# Patient Record
Sex: MALE | Race: WHITE | NOT HISPANIC OR LATINO | Employment: OTHER | URBAN - METROPOLITAN AREA
[De-identification: names, ages, dates, MRNs, and addresses within clinical notes are randomized per-mention and may not be internally consistent; named-entity substitution may affect disease eponyms.]

---

## 2023-09-14 PROBLEM — R51.9 HEADACHE: Status: RESOLVED | Noted: 2023-09-14 | Resolved: 2023-09-14

## 2023-09-14 PROBLEM — R03.0 ELEVATED BLOOD PRESSURE READING: Status: ACTIVE | Noted: 2023-09-14

## 2023-09-14 PROBLEM — E55.9 VITAMIN D DEFICIENCY: Status: ACTIVE | Noted: 2023-09-14

## 2023-09-14 PROBLEM — I73.9 CLAUDICATION (CMS-HCC): Status: ACTIVE | Noted: 2023-09-14

## 2023-09-14 PROBLEM — J01.90 ACUTE SINUSITIS: Status: RESOLVED | Noted: 2023-09-14 | Resolved: 2023-09-14

## 2023-09-14 PROBLEM — H69.90 EUSTACHIAN TUBE DYSFUNCTION: Status: ACTIVE | Noted: 2023-09-14

## 2023-09-14 PROBLEM — R10.13 DYSPEPSIA: Status: ACTIVE | Noted: 2023-09-14

## 2023-09-14 PROBLEM — K29.50 CHRONIC GASTRITIS WITHOUT BLEEDING: Status: ACTIVE | Noted: 2023-09-14

## 2023-09-14 PROBLEM — K20.90 ESOPHAGITIS: Status: ACTIVE | Noted: 2023-09-14

## 2023-09-14 PROBLEM — F10.10 ALCOHOL CONSUMPTION BINGE DRINKING: Status: ACTIVE | Noted: 2023-09-14

## 2023-09-14 PROBLEM — F41.9 ANXIETY: Status: ACTIVE | Noted: 2023-09-14

## 2023-09-14 PROBLEM — K22.10 BARRETT'S ESOPHAGEAL ULCERATION: Status: ACTIVE | Noted: 2023-09-14

## 2023-09-14 PROBLEM — I10 ESSENTIAL HYPERTENSION: Status: ACTIVE | Noted: 2023-09-14

## 2023-09-14 PROBLEM — G47.00 INSOMNIA: Status: ACTIVE | Noted: 2023-09-14

## 2023-09-14 PROBLEM — E78.5 DYSLIPIDEMIA: Status: ACTIVE | Noted: 2023-09-14

## 2023-09-14 PROBLEM — F32.A DEPRESSION: Status: ACTIVE | Noted: 2023-09-14

## 2023-09-14 PROBLEM — K22.719 BARRETT'S ESOPHAGUS WITH DYSPLASIA: Status: ACTIVE | Noted: 2023-09-14

## 2023-09-14 PROBLEM — I10 HYPERTENSIVE DISORDER: Status: RESOLVED | Noted: 2023-09-14 | Resolved: 2023-09-14

## 2023-09-14 RX ORDER — AMLODIPINE BESYLATE 5 MG/1
1 TABLET ORAL DAILY
COMMUNITY
Start: 2019-03-21 | End: 2023-11-14 | Stop reason: WASHOUT

## 2023-09-14 RX ORDER — LISINOPRIL 40 MG/1
1 TABLET ORAL DAILY
COMMUNITY
End: 2023-10-06

## 2023-09-14 RX ORDER — NAPROXEN SODIUM 220 MG
1 TABLET ORAL EVERY 12 HOURS PRN
COMMUNITY
End: 2023-11-14 | Stop reason: WASHOUT

## 2023-09-14 RX ORDER — VIT C/ZN GLUC/HERBAL NO.325 90 MG-15MG
1 LOZENGE MUCOUS MEMBRANE DAILY
COMMUNITY

## 2023-09-14 RX ORDER — LISINOPRIL 10 MG/1
TABLET ORAL
COMMUNITY
Start: 2019-03-21 | End: 2023-10-06 | Stop reason: ALTCHOICE

## 2023-09-14 RX ORDER — CALCIUM CARBONATE 600 MG
1 TABLET ORAL
COMMUNITY
End: 2023-11-14 | Stop reason: WASHOUT

## 2023-09-14 RX ORDER — PANTOPRAZOLE SODIUM 40 MG/1
1 TABLET, DELAYED RELEASE ORAL DAILY
COMMUNITY
Start: 2019-02-15

## 2023-09-14 RX ORDER — ACETAMINOPHEN 500 MG
1 TABLET ORAL DAILY
COMMUNITY
Start: 2018-12-13 | End: 2023-11-14 | Stop reason: WASHOUT

## 2023-09-14 RX ORDER — LISINOPRIL 20 MG/1
1 TABLET ORAL DAILY
COMMUNITY
End: 2023-10-06 | Stop reason: ALTCHOICE

## 2023-09-14 RX ORDER — HYDROXYZINE PAMOATE 25 MG/1
1 CAPSULE ORAL EVERY 6 HOURS PRN
COMMUNITY
End: 2023-11-14 | Stop reason: WASHOUT

## 2023-09-14 RX ORDER — GUAIFENESIN/PHENYLPROPANOLAMIN
EXPECTORANT ORAL
COMMUNITY
End: 2023-11-14 | Stop reason: WASHOUT

## 2023-09-14 RX ORDER — NAPROXEN SODIUM 220 MG/1
1 TABLET ORAL DAILY
COMMUNITY

## 2023-09-14 RX ORDER — VIT C/E/ZN/COPPR/LUTEIN/ZEAXAN 250MG-90MG
1 CAPSULE ORAL DAILY
COMMUNITY
End: 2023-11-14 | Stop reason: WASHOUT

## 2023-09-14 RX ORDER — TRAZODONE HYDROCHLORIDE 50 MG/1
1 TABLET ORAL NIGHTLY
COMMUNITY
Start: 2014-09-23 | End: 2023-11-14 | Stop reason: WASHOUT

## 2023-10-06 DIAGNOSIS — I10 ESSENTIAL HYPERTENSION: ICD-10-CM

## 2023-10-06 RX ORDER — LISINOPRIL 40 MG/1
40 TABLET ORAL DAILY
Qty: 90 TABLET | Refills: 1 | Status: SHIPPED | OUTPATIENT
Start: 2023-10-06 | End: 2024-04-09

## 2023-10-08 DIAGNOSIS — I10 ESSENTIAL HYPERTENSION: Primary | ICD-10-CM

## 2023-10-09 RX ORDER — AMLODIPINE BESYLATE 2.5 MG/1
2.5 TABLET ORAL DAILY
Qty: 30 TABLET | Refills: 2 | Status: SHIPPED | OUTPATIENT
Start: 2023-10-09 | End: 2023-12-21

## 2023-11-14 ENCOUNTER — OFFICE VISIT (OUTPATIENT)
Dept: GASTROENTEROLOGY | Facility: CLINIC | Age: 63
End: 2023-11-14
Payer: MEDICARE

## 2023-11-14 VITALS
SYSTOLIC BLOOD PRESSURE: 162 MMHG | DIASTOLIC BLOOD PRESSURE: 93 MMHG | HEIGHT: 70 IN | WEIGHT: 183 LBS | BODY MASS INDEX: 26.2 KG/M2 | HEART RATE: 74 BPM | TEMPERATURE: 97.4 F

## 2023-11-14 DIAGNOSIS — Z80.0 FAMILY HISTORY OF ESOPHAGEAL CANCER: Primary | ICD-10-CM

## 2023-11-14 DIAGNOSIS — K22.719 BARRETT'S ESOPHAGUS WITH DYSPLASIA: ICD-10-CM

## 2023-11-14 DIAGNOSIS — Z80.0 FAMILY HISTORY OF GASTRIC CANCER: ICD-10-CM

## 2023-11-14 DIAGNOSIS — K22.10 BARRETT'S ESOPHAGEAL ULCERATION: ICD-10-CM

## 2023-11-14 PROCEDURE — 99213 OFFICE O/P EST LOW 20 MIN: CPT | Performed by: INTERNAL MEDICINE

## 2023-11-14 PROCEDURE — 99203 OFFICE O/P NEW LOW 30 MIN: CPT | Performed by: INTERNAL MEDICINE

## 2023-11-14 PROCEDURE — 1159F MED LIST DOCD IN RCRD: CPT | Performed by: INTERNAL MEDICINE

## 2023-11-14 PROCEDURE — 3077F SYST BP >= 140 MM HG: CPT | Performed by: INTERNAL MEDICINE

## 2023-11-14 PROCEDURE — 1160F RVW MEDS BY RX/DR IN RCRD: CPT | Performed by: INTERNAL MEDICINE

## 2023-11-14 PROCEDURE — 1036F TOBACCO NON-USER: CPT | Performed by: INTERNAL MEDICINE

## 2023-11-14 PROCEDURE — 3080F DIAST BP >= 90 MM HG: CPT | Performed by: INTERNAL MEDICINE

## 2023-11-14 RX ORDER — ACETAMINOPHEN 500 MG
TABLET ORAL DAILY
COMMUNITY

## 2023-11-14 NOTE — PROGRESS NOTES
"Subjective   Patient ID: Jayjay Block is a 63 y.o. male who presents for New Patient Visit (/Discomfort in stomach).  Here as a new patient; daughter was a patient with bile duct stones   Retired from maintenance but still working   Jimenez's diagnosed 5 years ago with \"never ending heartburn\"  PPI used daily with only pantoprazole working for him; sometimes baking soda helps too  Other PPI not effective  Dr. Torres did last EGD back in 2019 Jimenez's from 34-36 cm    EGD repeated 2023 - [facility]   Doctor said to take pantoprazole twice a day  Emesis last week with bile   Episodes last 3 to 5 days  Rx Trazodone but did not try it due to labeling and using melatonin prn    Anxiety runs in the family   Sister with schizophrenia     GB removed 20 years ago by Dr Booth           Review of Systems    Objective   Physical Exam  Constitutional:       Appearance: Normal appearance.   Pulmonary:      Effort: Pulmonary effort is normal.   Neurological:      Mental Status: He is alert.   Psychiatric:      Comments: Seems anxious         Assessment/Plan   Assess for esophageal spasm/uncontrolled bile reflux on PPI  Assess for dilated bile duct as cause of intermittent pain reminiscent of biliary colic        "

## 2023-11-14 NOTE — PATIENT INSTRUCTIONS
You are a candidate for esophageal motility testing and pH/impedence testing to see if the ongoing symptoms are due to spasm or uncontrolled reflux.

## 2023-11-15 ENCOUNTER — HOSPITAL ENCOUNTER (OUTPATIENT)
Dept: RADIOLOGY | Facility: HOSPITAL | Age: 63
Discharge: HOME | End: 2023-11-15
Payer: MEDICARE

## 2023-11-15 DIAGNOSIS — Z80.0 FAMILY HISTORY OF GASTRIC CANCER: ICD-10-CM

## 2023-11-15 DIAGNOSIS — Z80.0 FAMILY HISTORY OF ESOPHAGEAL CANCER: ICD-10-CM

## 2023-11-15 DIAGNOSIS — K22.10 BARRETT'S ESOPHAGEAL ULCERATION: ICD-10-CM

## 2023-11-15 PROCEDURE — 76705 ECHO EXAM OF ABDOMEN: CPT

## 2023-11-27 ENCOUNTER — HOSPITAL ENCOUNTER (OUTPATIENT)
Dept: GASTROENTEROLOGY | Facility: HOSPITAL | Age: 63
Discharge: HOME | End: 2023-11-27
Payer: MEDICARE

## 2023-11-27 VITALS
SYSTOLIC BLOOD PRESSURE: 171 MMHG | RESPIRATION RATE: 16 BRPM | DIASTOLIC BLOOD PRESSURE: 84 MMHG | OXYGEN SATURATION: 100 % | HEART RATE: 58 BPM

## 2023-11-27 DIAGNOSIS — K22.10 BARRETT'S ESOPHAGEAL ULCERATION: ICD-10-CM

## 2023-11-27 DIAGNOSIS — Z80.0 FAMILY HISTORY OF GASTRIC CANCER: ICD-10-CM

## 2023-11-27 DIAGNOSIS — Z80.0 FAMILY HISTORY OF ESOPHAGEAL CANCER: ICD-10-CM

## 2023-11-27 PROCEDURE — 2500000001 HC RX 250 WO HCPCS SELF ADMINISTERED DRUGS (ALT 637 FOR MEDICARE OP): Performed by: INTERNAL MEDICINE

## 2023-11-27 PROCEDURE — 91010 ESOPHAGUS MOTILITY STUDY: CPT | Mod: MUE

## 2023-11-27 PROCEDURE — 91010 ESOPHAGUS MOTILITY STUDY: CPT

## 2023-11-27 PROCEDURE — 91010 ESOPHAGUS MOTILITY STUDY: CPT | Performed by: INTERNAL MEDICINE

## 2023-11-27 RX ORDER — LIDOCAINE HYDROCHLORIDE 20 MG/ML
1 JELLY TOPICAL ONCE
Status: COMPLETED | OUTPATIENT
Start: 2023-11-27 | End: 2023-11-27

## 2023-11-27 RX ADMIN — LIDOCAINE HYDROCHLORIDE 1 APPLICATION: 20 JELLY TOPICAL at 10:30

## 2023-11-27 ASSESSMENT — PAIN SCALES - GENERAL: PAINLEVEL_OUTOF10: 0 - NO PAIN

## 2023-12-13 PROCEDURE — 91037 ESOPH IMPED FUNCTION TEST: CPT | Performed by: INTERNAL MEDICINE

## 2023-12-13 NOTE — ADDENDUM NOTE
Encounter addended by: Billy Johnson MD on: 12/13/2023 4:13 PM   Actions taken: Charge Capture section accepted

## 2023-12-19 ENCOUNTER — OFFICE VISIT (OUTPATIENT)
Dept: GASTROENTEROLOGY | Facility: CLINIC | Age: 63
End: 2023-12-19
Payer: MEDICARE

## 2023-12-19 VITALS
WEIGHT: 190.9 LBS | HEART RATE: 60 BPM | SYSTOLIC BLOOD PRESSURE: 128 MMHG | TEMPERATURE: 98.2 F | HEIGHT: 70 IN | BODY MASS INDEX: 27.33 KG/M2 | DIASTOLIC BLOOD PRESSURE: 73 MMHG

## 2023-12-19 DIAGNOSIS — K22.70 BARRETT'S ESOPHAGUS WITHOUT DYSPLASIA: Primary | ICD-10-CM

## 2023-12-19 PROCEDURE — 1159F MED LIST DOCD IN RCRD: CPT | Performed by: INTERNAL MEDICINE

## 2023-12-19 PROCEDURE — 99212 OFFICE O/P EST SF 10 MIN: CPT | Performed by: INTERNAL MEDICINE

## 2023-12-19 PROCEDURE — 1125F AMNT PAIN NOTED PAIN PRSNT: CPT | Performed by: INTERNAL MEDICINE

## 2023-12-19 PROCEDURE — 3074F SYST BP LT 130 MM HG: CPT | Performed by: INTERNAL MEDICINE

## 2023-12-19 PROCEDURE — 1036F TOBACCO NON-USER: CPT | Performed by: INTERNAL MEDICINE

## 2023-12-19 PROCEDURE — 3078F DIAST BP <80 MM HG: CPT | Performed by: INTERNAL MEDICINE

## 2023-12-19 PROCEDURE — 1160F RVW MEDS BY RX/DR IN RCRD: CPT | Performed by: INTERNAL MEDICINE

## 2023-12-19 RX ORDER — GUAIFENESIN/PHENYLPROPANOLAMIN
EXPECTORANT ORAL
COMMUNITY

## 2023-12-19 RX ORDER — VIT C/ZN GLUC/HERBAL NO.325 90 MG-15MG
LOZENGE MUCOUS MEMBRANE EVERY 24 HOURS
COMMUNITY
End: 2023-12-19 | Stop reason: WASHOUT

## 2023-12-19 RX ORDER — GLUC/MSM/COLGN2/HYAL/ANTIARTH3 375-375-20
TABLET ORAL EVERY 24 HOURS
COMMUNITY

## 2023-12-19 RX ORDER — NAPROXEN SODIUM 220 MG/1
1 TABLET ORAL EVERY 24 HOURS
COMMUNITY
End: 2023-12-19 | Stop reason: WASHOUT

## 2023-12-19 RX ORDER — VIT C/E/ZN/COPPR/LUTEIN/ZEAXAN 250MG-90MG
1 CAPSULE ORAL EVERY 24 HOURS
COMMUNITY

## 2023-12-19 ASSESSMENT — PAIN SCALES - GENERAL: PAINLEVEL: 2

## 2023-12-19 NOTE — PATIENT INSTRUCTIONS
Glad you are feeling well  No spasms were noted on the esophageal motility study   Stay on the pantoprazole  You are due for endoscopy with surveillance every 3 because of your family history

## 2023-12-19 NOTE — PROGRESS NOTES
Subjective   Patient ID: Jayjay Block is a 63 y.o. male who presents for Follow-up.  Here for follow up after EMOT for BE without dysplasia    EMOT showed low LES and incomplete bolus passage    Feels better due to less stress - changes he made in his environment          Review of Systems    Objective   Physical Exam  Constitutional:       Appearance: Normal appearance.   Neurological:      Mental Status: He is alert.         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

## 2023-12-20 DIAGNOSIS — I10 ESSENTIAL HYPERTENSION: ICD-10-CM

## 2023-12-21 RX ORDER — AMLODIPINE BESYLATE 2.5 MG/1
2.5 TABLET ORAL DAILY
Qty: 90 TABLET | Refills: 0 | Status: SHIPPED | OUTPATIENT
Start: 2023-12-21 | End: 2024-04-09

## 2024-03-05 NOTE — PROGRESS NOTES
Subjective   Patient ID: Jayjay Block is a 63 y.o. male who presents for Annual Exam.    HPI        H/O hypertension- compliant with medications and low-salt diet. Denied any side effects from the medication. Denied any headaches, chest pain, pedal edema.              H/O of tobacco abuse. Quit smoking 15 years ago. Stated he used to smoke only half a pack a day, smoked for 20-25 years.         H/O Anxiety- stated since he retired, anxiety symptoms better. Denied any complaints today.         Elevated PSA- has not followed with urologist yet  Saw Dr Bonner for GERD, had  Ph testing, symptoms better now. H/O esophageal and stomach cancer in family    Complaining of itching on back for past few weeks, symptoms usually get better in summer    Refused pneumonia vaccine    Review of Systems   Constitutional:  Negative for fatigue and unexpected weight change.   HENT:  Negative for congestion, ear pain, sinus pain and sore throat.    Respiratory:  Negative for cough, shortness of breath and wheezing.    Cardiovascular:  Negative for chest pain, palpitations and leg swelling.   Gastrointestinal:  Negative for abdominal pain, blood in stool and constipation.   Endocrine: Negative for polydipsia, polyphagia and polyuria.   Genitourinary:  Negative for hematuria and urgency.   Musculoskeletal:  Negative for arthralgias and joint swelling.   Skin:  Negative for rash.        Itching on back and ankles   Neurological:  Negative for tremors, syncope, weakness and numbness.   Psychiatric/Behavioral:  Positive for behavioral problems. The patient is not nervous/anxious.        Objective   /75 (BP Location: Left arm, Patient Position: Sitting, BP Cuff Size: Adult)   Pulse 82   Temp 36.7 °C (98 °F) (Temporal)   Wt 86.2 kg (190 lb)   SpO2 98%   BMI 27.26 kg/m²     Physical Exam  Constitutional:       General: He is not in acute distress.  HENT:      Head: Normocephalic and atraumatic.      Right Ear: Tympanic membrane  normal.      Left Ear: Tympanic membrane normal.   Eyes:      Extraocular Movements: Extraocular movements intact.      Conjunctiva/sclera: Conjunctivae normal.      Pupils: Pupils are equal, round, and reactive to light.   Neck:      Vascular: No carotid bruit.   Cardiovascular:      Rate and Rhythm: Normal rate and regular rhythm.      Pulses: Normal pulses.      Heart sounds: No murmur heard.  Pulmonary:      Effort: Pulmonary effort is normal.      Breath sounds: Normal breath sounds. No wheezing or rales.   Abdominal:      General: Bowel sounds are normal.      Palpations: Abdomen is soft. There is no mass.      Tenderness: There is no abdominal tenderness. There is no guarding.   Musculoskeletal:         General: Normal range of motion.      Cervical back: Normal range of motion and neck supple.      Right lower leg: No edema.      Left lower leg: No edema.   Lymphadenopathy:      Cervical: No cervical adenopathy.   Skin:     General: Skin is warm and dry.      Capillary Refill: Capillary refill takes less than 2 seconds.      Comments: Tiny raised brown spot on his back with minimal scaly area   Neurological:      General: No focal deficit present.      Mental Status: He is alert and oriented to person, place, and time.      Sensory: No sensory deficit.      Gait: Gait normal.   Psychiatric:         Mood and Affect: Mood normal.         Assessment/Plan        Jayjay was seen today for annual exam.  Diagnoses and all orders for this visit:  Medicare annual wellness visit, initial (Primary)  Essential hypertension  -     CBC and Auto Differential; Future  -     Comprehensive Metabolic Panel; Future  Dyslipidemia  -     Lipid Panel; Future  Screening for prostate cancer  -     Prostate Specific Antigen, Screen; Future  Anxiety  -     TSH with reflex to Free T4 if abnormal; Future  Hyperglycemia  -     Hemoglobin A1C; Future       Advised to use CeraVe or Cetaphil over-the-counter  Recommended follow-up with  dermatology for yearly skin check    Current Outpatient Medications   Medication Instructions    amLODIPine (NORVASC) 2.5 mg, oral, Daily    calcium carbonate (CALCIUM 600 ORAL) oral    calcium carbonate-vitamin D3 (Calcium 600 + D,3,) 600 mg-5 mcg (200 unit) capsule Every 24 hours    cholecalciferol (Vitamin D-3) 25 MCG (1000 UT) capsule 1 capsule, Every 24 hours    cholecalciferol (Vitamin D3) 5,000 Units tablet oral, Daily    lisinopril 40 mg, oral, Daily    omega 3-dha-epa-fish oil (Fish OiL) 1,200 (144-216) mg capsule 1 capsule, oral, Daily    pantoprazole (ProtoNix) 40 mg EC tablet 1 tablet, oral, Daily    saw palmetto 500 mg capsule as directed Orally    TURMERIC ORAL oral    vit C-Zn gluc-herbal no.325 (Elderberry Zinc Vit C) 90-15 mg lozenge 1 tablet, oral, Daily

## 2024-03-06 ENCOUNTER — OFFICE VISIT (OUTPATIENT)
Dept: PRIMARY CARE | Facility: CLINIC | Age: 64
End: 2024-03-06
Payer: MEDICARE

## 2024-03-06 VITALS
WEIGHT: 190 LBS | TEMPERATURE: 98 F | BODY MASS INDEX: 27.26 KG/M2 | SYSTOLIC BLOOD PRESSURE: 130 MMHG | OXYGEN SATURATION: 98 % | DIASTOLIC BLOOD PRESSURE: 75 MMHG | HEART RATE: 82 BPM

## 2024-03-06 DIAGNOSIS — Z12.5 SCREENING FOR PROSTATE CANCER: ICD-10-CM

## 2024-03-06 DIAGNOSIS — R73.9 HYPERGLYCEMIA: ICD-10-CM

## 2024-03-06 DIAGNOSIS — Z00.00 MEDICARE ANNUAL WELLNESS VISIT, INITIAL: Primary | ICD-10-CM

## 2024-03-06 DIAGNOSIS — I10 ESSENTIAL HYPERTENSION: ICD-10-CM

## 2024-03-06 DIAGNOSIS — F41.9 ANXIETY: ICD-10-CM

## 2024-03-06 DIAGNOSIS — E78.5 DYSLIPIDEMIA: ICD-10-CM

## 2024-03-06 PROCEDURE — 1124F ACP DISCUSS-NO DSCNMKR DOCD: CPT | Performed by: INTERNAL MEDICINE

## 2024-03-06 PROCEDURE — 99215 OFFICE O/P EST HI 40 MIN: CPT | Performed by: INTERNAL MEDICINE

## 2024-03-06 PROCEDURE — 1036F TOBACCO NON-USER: CPT | Performed by: INTERNAL MEDICINE

## 2024-03-06 PROCEDURE — 1159F MED LIST DOCD IN RCRD: CPT | Performed by: INTERNAL MEDICINE

## 2024-03-06 PROCEDURE — G0438 PPPS, INITIAL VISIT: HCPCS | Performed by: INTERNAL MEDICINE

## 2024-03-06 PROCEDURE — 1126F AMNT PAIN NOTED NONE PRSNT: CPT | Performed by: INTERNAL MEDICINE

## 2024-03-06 PROCEDURE — 3075F SYST BP GE 130 - 139MM HG: CPT | Performed by: INTERNAL MEDICINE

## 2024-03-06 PROCEDURE — 3078F DIAST BP <80 MM HG: CPT | Performed by: INTERNAL MEDICINE

## 2024-03-06 ASSESSMENT — ENCOUNTER SYMPTOMS
SHORTNESS OF BREATH: 0
JOINT SWELLING: 0
NERVOUS/ANXIOUS: 0
TREMORS: 0
ABDOMINAL PAIN: 0
FATIGUE: 0
HEMATURIA: 0
ARTHRALGIAS: 0
WHEEZING: 0
PALPITATIONS: 0
SORE THROAT: 0
LOSS OF SENSATION IN FEET: 0
SINUS PAIN: 0
NUMBNESS: 0
COUGH: 0
UNEXPECTED WEIGHT CHANGE: 0
WEAKNESS: 0
DEPRESSION: 0
POLYPHAGIA: 0
CONSTIPATION: 0
OCCASIONAL FEELINGS OF UNSTEADINESS: 0
BLOOD IN STOOL: 0
POLYDIPSIA: 0

## 2024-03-06 ASSESSMENT — PATIENT HEALTH QUESTIONNAIRE - PHQ9
SUM OF ALL RESPONSES TO PHQ9 QUESTIONS 1 AND 2: 0
2. FEELING DOWN, DEPRESSED OR HOPELESS: NOT AT ALL
1. LITTLE INTEREST OR PLEASURE IN DOING THINGS: NOT AT ALL

## 2024-03-06 ASSESSMENT — PAIN SCALES - GENERAL: PAINLEVEL: 0-NO PAIN

## 2024-04-08 DIAGNOSIS — I10 ESSENTIAL HYPERTENSION: ICD-10-CM

## 2024-04-09 RX ORDER — LISINOPRIL 40 MG/1
40 TABLET ORAL DAILY
Qty: 90 TABLET | Refills: 1 | Status: SHIPPED | OUTPATIENT
Start: 2024-04-09

## 2024-04-09 RX ORDER — AMLODIPINE BESYLATE 2.5 MG/1
2.5 TABLET ORAL DAILY
Qty: 90 TABLET | Refills: 1 | Status: SHIPPED | OUTPATIENT
Start: 2024-04-09

## 2024-07-30 ENCOUNTER — OFFICE VISIT (OUTPATIENT)
Dept: CARDIOLOGY | Facility: CLINIC | Age: 64
End: 2024-07-30
Payer: MEDICARE

## 2024-07-30 VITALS
OXYGEN SATURATION: 97 % | WEIGHT: 191.2 LBS | DIASTOLIC BLOOD PRESSURE: 76 MMHG | SYSTOLIC BLOOD PRESSURE: 160 MMHG | HEART RATE: 60 BPM | HEIGHT: 70 IN | BODY MASS INDEX: 27.37 KG/M2

## 2024-07-30 DIAGNOSIS — I10 ESSENTIAL HYPERTENSION: Primary | ICD-10-CM

## 2024-07-30 DIAGNOSIS — I50.9 CONGESTIVE HEART FAILURE WITH CARDIOMYOPATHY AND CARDIOMEGALY (MULTI): ICD-10-CM

## 2024-07-30 DIAGNOSIS — I42.9 CONGESTIVE HEART FAILURE WITH CARDIOMYOPATHY AND CARDIOMEGALY (MULTI): ICD-10-CM

## 2024-07-30 DIAGNOSIS — I51.7 CONGESTIVE HEART FAILURE WITH CARDIOMYOPATHY AND CARDIOMEGALY (MULTI): ICD-10-CM

## 2024-07-30 PROCEDURE — 1159F MED LIST DOCD IN RCRD: CPT | Performed by: INTERNAL MEDICINE

## 2024-07-30 PROCEDURE — 99214 OFFICE O/P EST MOD 30 MIN: CPT | Performed by: INTERNAL MEDICINE

## 2024-07-30 PROCEDURE — 3008F BODY MASS INDEX DOCD: CPT | Performed by: INTERNAL MEDICINE

## 2024-07-30 PROCEDURE — 93005 ELECTROCARDIOGRAM TRACING: CPT | Performed by: INTERNAL MEDICINE

## 2024-07-30 PROCEDURE — 1036F TOBACCO NON-USER: CPT | Performed by: INTERNAL MEDICINE

## 2024-07-30 PROCEDURE — 93010 ELECTROCARDIOGRAM REPORT: CPT | Performed by: INTERNAL MEDICINE

## 2024-07-30 PROCEDURE — 1126F AMNT PAIN NOTED NONE PRSNT: CPT | Performed by: INTERNAL MEDICINE

## 2024-07-30 PROCEDURE — 3078F DIAST BP <80 MM HG: CPT | Performed by: INTERNAL MEDICINE

## 2024-07-30 PROCEDURE — 3077F SYST BP >= 140 MM HG: CPT | Performed by: INTERNAL MEDICINE

## 2024-07-30 PROCEDURE — 99204 OFFICE O/P NEW MOD 45 MIN: CPT | Performed by: INTERNAL MEDICINE

## 2024-07-30 ASSESSMENT — ENCOUNTER SYMPTOMS
DEPRESSION: 0
OCCASIONAL FEELINGS OF UNSTEADINESS: 0
LOSS OF SENSATION IN FEET: 0

## 2024-07-30 ASSESSMENT — PAIN SCALES - GENERAL: PAINLEVEL: 0-NO PAIN

## 2024-07-30 NOTE — PROGRESS NOTES
Primary Care Physician: Joselyn Ma MD  Date of Visit: 07/30/2024  2:15 PM EDT  Location of visit: [facility]     Chief Complaint:   Chief Complaint   Patient presents with    New Patient Visit     HPI / Summary:   Jayjay Block is a 63 y.o. male presents for new patient.    ROS    Medical History:   He has a past medical history of Acute sinusitis (09/14/2023), Bilateral tinnitus, GERD (gastroesophageal reflux disease), Headache (09/14/2023), and Hypertension.  Surgical Hx:   He has a past surgical history that includes Appendectomy and Cholecystectomy.   Social Hx:   He reports that he has never smoked. He has never used smokeless tobacco. He reports current alcohol use of about 4.0 standard drinks of alcohol per week. He reports current drug use. Drug: Marijuana.  Family Hx:   His family history includes Dementia in his mother; Esophageal cancer in his father; Parkinsonism in his mother.   Allergies:  No Known Allergies  Outpatient Medications:  Current Outpatient Medications   Medication Instructions    amLODIPine (NORVASC) 2.5 mg, oral, Daily    calcium carbonate (CALCIUM 600 ORAL) oral    calcium carbonate-vitamin D3 (Calcium 600 + D,3,) 600 mg-5 mcg (200 unit) capsule Every 24 hours    cholecalciferol (Vitamin D-3) 25 MCG (1000 UT) capsule 1 capsule, Every 24 hours    cholecalciferol (Vitamin D3) 5,000 Units tablet oral, Daily    lisinopril 40 mg, oral, Daily    omega 3-dha-epa-fish oil (Fish OiL) 1,200 (144-216) mg capsule 1 capsule, oral, Daily    pantoprazole (ProtoNix) 40 mg EC tablet 1 tablet, oral, Daily    saw palmetto 500 mg capsule as directed Orally    TURMERIC ORAL oral    vit C-Zn gluc-herbal no.325 (Elderberry Zinc Vit C) 90-15 mg lozenge 1 tablet, oral, Daily     Physical Exam:  Vitals:    07/30/24 1403 07/30/24 1451   BP: 159/82 160/76   BP Location: Right arm    Patient Position: Sitting    BP Cuff Size: Adult    Pulse: 64 60   SpO2: 97%    Weight: 86.7 kg (191 lb 3.2 oz)   "  Height: 1.778 m (5' 10\")      Wt Readings from Last 5 Encounters:   07/30/24 86.7 kg (191 lb 3.2 oz)   03/06/24 86.2 kg (190 lb)   12/19/23 86.6 kg (190 lb 14.4 oz)   11/14/23 83 kg (183 lb)   09/06/23 87.1 kg (192 lb)     Physical Exam  JVP not elevated. Carotid impulses are 2+ without overlying bruit.   Chest exhibits fair to good air movement with completely clear breath sounds.   The cardiac rhythm is regular with no premature beats.   Normal S1 and S2. No gallop, murmur or rub, or click.   Abdomen is soft and benign without focal tenderness.   With no lower leg edema. The pedal pulses are intact.     Last Labs:  No visits with results within 6 Month(s) from this visit.   Latest known visit with results is:   [facility] Encounter on 03/12/2020   Component Date Value    Flu A By PCR 03/12/2020 NEGATIVE     Flu B By PCR 03/12/2020                      Value:NEGATIVE  Performed at [facility],[address] [place] [id number]          Assessment/Plan   1.  Hypertension.  This patient was diagnosed as being hypertensive approximately 5 years ago initially started on lisinopril 40 mg daily.  Recent blood pressure readings remained elevated and he was given a prescription for amlodipine 5 mg daily.  Subsequent to this he began to have symptoms of postural hypotension and lightheadedness with standing up or bending over.  He discontinued the amlodipine and felt improved.  He was seen by his primary care provider and ultimately restarted on a lower dose of amlodipine 2.5 mg daily.  The patient states that his home blood pressure readings are averaging in the range of 128/75.  Office blood pressure 160/76.  Patient will continue on present therapy but return in 3 months.  He will purchase a home blood pressure monitor and provided a log of his readings upon return.  He will also bring his home blood pressure cuff to verify its accuracy.  2.  Nondiabetic.  3.  Not hyperlipidemic.  Patient did have a lipid panel on " 2020 with cholesterol 185  triglyceride 107.  Will defer therapy unless his CT coronary calcium score is elevated.  4.  Former smoker.  This patient was a smoker 3/4 pack/day between the ages of [age] but he has been abstinent since that time  5.?  Coronary artery disease.  The patient's family history is positive for father who had underwent 2 CABG procedures ultimately  of esophageal cancer.  He has 3 sisters 1 brother who are alive and well.  He did have a nuclear stress test performed on 3/4/2020 which was unremarkable.  EKG during office visit 2024 shows sinus bradycardia 52/min LVH by voltage criteria.  The patient will be scheduled for a CT coronary calcium score.  He will also have an echocardiogram performed at the time of his next visit in 3 months.  6.  Status post appendectomy age 6.  7.  Status post vasectomy.  8.  Status post laparoscopic cholecystectomy.        Orders:  Orders Placed This Encounter   Procedures    CT cardiac scoring wo IV contrast    ECG 12 lead (Clinic Performed)    Transthoracic echo (TTE) complete      Followup Appts:  Future Appointments   Date Time Provider Department Center   2024  8:45 AM [facility] ECHO LAB 1 [id number] [facility]   2024 10:30 AM Joselyn Ma MD [id number] [facility]   2024  8:00 AM [facility] CT 1 W[facility]CT [facility]           ____________________________________________________________  Jd Mckee MD  [facility]  Assistant Clinical Professor, [employer]  [facility]

## 2024-08-01 LAB
ATRIAL RATE: 52 BPM
P AXIS: 51 DEGREES
P OFFSET: 201 MS
P ONSET: 141 MS
PR INTERVAL: 158 MS
Q ONSET: 220 MS
QRS COUNT: 8 BEATS
QRS DURATION: 98 MS
QT INTERVAL: 426 MS
QTC CALCULATION(BAZETT): 396 MS
QTC FREDERICIA: 406 MS
R AXIS: 33 DEGREES
T AXIS: 45 DEGREES
T OFFSET: 433 MS
VENTRICULAR RATE: 52 BPM

## 2024-08-20 ENCOUNTER — HOSPITAL ENCOUNTER (OUTPATIENT)
Dept: CARDIOLOGY | Facility: HOSPITAL | Age: 64
Discharge: HOME | End: 2024-08-20
Payer: MEDICARE

## 2024-08-20 ENCOUNTER — LAB (OUTPATIENT)
Dept: LAB | Facility: LAB | Age: 64
End: 2024-08-20
Payer: MEDICARE

## 2024-08-20 DIAGNOSIS — E78.5 DYSLIPIDEMIA: ICD-10-CM

## 2024-08-20 DIAGNOSIS — I50.9 CONGESTIVE HEART FAILURE WITH CARDIOMYOPATHY AND CARDIOMEGALY (MULTI): ICD-10-CM

## 2024-08-20 DIAGNOSIS — R73.9 HYPERGLYCEMIA: ICD-10-CM

## 2024-08-20 DIAGNOSIS — Z12.5 SCREENING FOR PROSTATE CANCER: ICD-10-CM

## 2024-08-20 DIAGNOSIS — I10 ESSENTIAL HYPERTENSION: ICD-10-CM

## 2024-08-20 DIAGNOSIS — I42.9 CONGESTIVE HEART FAILURE WITH CARDIOMYOPATHY AND CARDIOMEGALY (MULTI): ICD-10-CM

## 2024-08-20 DIAGNOSIS — I51.7 CONGESTIVE HEART FAILURE WITH CARDIOMYOPATHY AND CARDIOMEGALY (MULTI): ICD-10-CM

## 2024-08-20 DIAGNOSIS — F41.9 ANXIETY: ICD-10-CM

## 2024-08-20 LAB
ALBUMIN SERPL-MCNC: 4.3 G/DL (ref 3.5–5)
ALP BLD-CCNC: 69 U/L (ref 35–125)
ALT SERPL-CCNC: 9 U/L (ref 5–40)
ANION GAP SERPL CALC-SCNC: 8 MMOL/L
AORTIC VALVE MEAN GRADIENT: 2.7 MMHG
AORTIC VALVE PEAK VELOCITY: 1.44 M/S
AST SERPL-CCNC: 17 U/L (ref 5–40)
AV PEAK GRADIENT: 8.2 MMHG
AVA (PEAK VEL): 2.08 CM2
BASOPHILS # BLD AUTO: 0.06 X10*3/UL (ref 0–0.1)
BASOPHILS NFR BLD AUTO: 0.8 %
BILIRUB SERPL-MCNC: 0.3 MG/DL (ref 0.1–1.2)
BUN SERPL-MCNC: 22 MG/DL (ref 8–25)
CALCIUM SERPL-MCNC: 9.2 MG/DL (ref 8.5–10.4)
CHLORIDE SERPL-SCNC: 105 MMOL/L (ref 97–107)
CHOLEST SERPL-MCNC: 163 MG/DL (ref 133–200)
CHOLEST/HDLC SERPL: 3.7 {RATIO}
CO2 SERPL-SCNC: 28 MMOL/L (ref 24–31)
CREAT SERPL-MCNC: 1.3 MG/DL (ref 0.4–1.6)
EGFRCR SERPLBLD CKD-EPI 2021: 61 ML/MIN/1.73M*2
EJECTION FRACTION APICAL 4 CHAMBER: 62.7
EJECTION FRACTION: 58 %
EOSINOPHIL # BLD AUTO: 0.31 X10*3/UL (ref 0–0.7)
EOSINOPHIL NFR BLD AUTO: 4.2 %
ERYTHROCYTE [DISTWIDTH] IN BLOOD BY AUTOMATED COUNT: 12.6 % (ref 11.5–14.5)
EST. AVERAGE GLUCOSE BLD GHB EST-MCNC: 123 MG/DL
GLUCOSE SERPL-MCNC: 104 MG/DL (ref 65–99)
HBA1C MFR BLD: 5.9 %
HCT VFR BLD AUTO: 41.5 % (ref 41–52)
HDLC SERPL-MCNC: 44 MG/DL
HGB BLD-MCNC: 13.7 G/DL (ref 13.5–17.5)
IMM GRANULOCYTES # BLD AUTO: 0.01 X10*3/UL (ref 0–0.7)
IMM GRANULOCYTES NFR BLD AUTO: 0.1 % (ref 0–0.9)
LDLC SERPL CALC-MCNC: 99 MG/DL (ref 65–130)
LEFT ATRIUM VOLUME AREA LENGTH INDEX BSA: 19.9 ML/M2
LEFT VENTRICLE INTERNAL DIMENSION DIASTOLE: 4.58 CM (ref 3.5–6)
LEFT VENTRICULAR OUTFLOW TRACT DIAMETER: 2.14 CM
LYMPHOCYTES # BLD AUTO: 2.35 X10*3/UL (ref 1.2–4.8)
LYMPHOCYTES NFR BLD AUTO: 31.9 %
MCH RBC QN AUTO: 30.5 PG (ref 26–34)
MCHC RBC AUTO-ENTMCNC: 33 G/DL (ref 32–36)
MCV RBC AUTO: 92 FL (ref 80–100)
MITRAL VALVE E/A RATIO: 0.98
MITRAL VALVE E/E' RATIO: 7.4
MONOCYTES # BLD AUTO: 0.62 X10*3/UL (ref 0.1–1)
MONOCYTES NFR BLD AUTO: 8.4 %
NEUTROPHILS # BLD AUTO: 4.01 X10*3/UL (ref 1.2–7.7)
NEUTROPHILS NFR BLD AUTO: 54.6 %
NRBC BLD-RTO: 0 /100 WBCS (ref 0–0)
PLATELET # BLD AUTO: 159 X10*3/UL (ref 150–450)
POTASSIUM SERPL-SCNC: 4.8 MMOL/L (ref 3.4–5.1)
PROT SERPL-MCNC: 6.9 G/DL (ref 5.9–7.9)
PSA SERPL-MCNC: 7 NG/ML
RBC # BLD AUTO: 4.49 X10*6/UL (ref 4.5–5.9)
RIGHT VENTRICLE FREE WALL PEAK S': 13.87 CM/S
RIGHT VENTRICLE PEAK SYSTOLIC PRESSURE: 24.8 MMHG
SODIUM SERPL-SCNC: 141 MMOL/L (ref 133–145)
TRICUSPID ANNULAR PLANE SYSTOLIC EXCURSION: 2.9 CM
TRIGL SERPL-MCNC: 102 MG/DL (ref 40–150)
TSH SERPL DL<=0.05 MIU/L-ACNC: 1.82 MIU/L (ref 0.27–4.2)
WBC # BLD AUTO: 7.4 X10*3/UL (ref 4.4–11.3)

## 2024-08-20 PROCEDURE — 93306 TTE W/DOPPLER COMPLETE: CPT

## 2024-08-20 PROCEDURE — 80061 LIPID PANEL: CPT

## 2024-08-20 PROCEDURE — 83036 HEMOGLOBIN GLYCOSYLATED A1C: CPT

## 2024-08-20 PROCEDURE — 36415 COLL VENOUS BLD VENIPUNCTURE: CPT

## 2024-08-20 PROCEDURE — 80053 COMPREHEN METABOLIC PANEL: CPT

## 2024-08-20 PROCEDURE — G0103 PSA SCREENING: HCPCS

## 2024-08-20 PROCEDURE — 85025 COMPLETE CBC W/AUTO DIFF WBC: CPT

## 2024-08-20 PROCEDURE — 84443 ASSAY THYROID STIM HORMONE: CPT

## 2024-08-20 PROCEDURE — 93306 TTE W/DOPPLER COMPLETE: CPT | Performed by: INTERNAL MEDICINE

## 2024-09-16 ENCOUNTER — OFFICE VISIT (OUTPATIENT)
Dept: PRIMARY CARE | Facility: CLINIC | Age: 64
End: 2024-09-16
Payer: MEDICARE

## 2024-09-16 VITALS
DIASTOLIC BLOOD PRESSURE: 82 MMHG | TEMPERATURE: 98.1 F | BODY MASS INDEX: 27.12 KG/M2 | HEART RATE: 67 BPM | OXYGEN SATURATION: 98 % | SYSTOLIC BLOOD PRESSURE: 138 MMHG | WEIGHT: 189 LBS

## 2024-09-16 DIAGNOSIS — R03.0 ELEVATED BLOOD PRESSURE READING: ICD-10-CM

## 2024-09-16 DIAGNOSIS — R97.20 ELEVATED PSA, LESS THAN 10 NG/ML: ICD-10-CM

## 2024-09-16 DIAGNOSIS — E78.5 DYSLIPIDEMIA: ICD-10-CM

## 2024-09-16 DIAGNOSIS — I10 ESSENTIAL HYPERTENSION: Primary | ICD-10-CM

## 2024-09-16 DIAGNOSIS — R73.03 PRE-DIABETES: ICD-10-CM

## 2024-09-16 PROCEDURE — G2211 COMPLEX E/M VISIT ADD ON: HCPCS | Performed by: INTERNAL MEDICINE

## 2024-09-16 PROCEDURE — 99213 OFFICE O/P EST LOW 20 MIN: CPT | Performed by: INTERNAL MEDICINE

## 2024-09-16 PROCEDURE — 3075F SYST BP GE 130 - 139MM HG: CPT | Performed by: INTERNAL MEDICINE

## 2024-09-16 PROCEDURE — 1126F AMNT PAIN NOTED NONE PRSNT: CPT | Performed by: INTERNAL MEDICINE

## 2024-09-16 PROCEDURE — 1159F MED LIST DOCD IN RCRD: CPT | Performed by: INTERNAL MEDICINE

## 2024-09-16 PROCEDURE — 1157F ADVNC CARE PLAN IN RCRD: CPT | Performed by: INTERNAL MEDICINE

## 2024-09-16 PROCEDURE — 3079F DIAST BP 80-89 MM HG: CPT | Performed by: INTERNAL MEDICINE

## 2024-09-16 ASSESSMENT — PATIENT HEALTH QUESTIONNAIRE - PHQ9
1. LITTLE INTEREST OR PLEASURE IN DOING THINGS: NOT AT ALL
2. FEELING DOWN, DEPRESSED OR HOPELESS: NOT AT ALL
SUM OF ALL RESPONSES TO PHQ9 QUESTIONS 1 AND 2: 0

## 2024-09-16 ASSESSMENT — ENCOUNTER SYMPTOMS
OCCASIONAL FEELINGS OF UNSTEADINESS: 0
LOSS OF SENSATION IN FEET: 0
DEPRESSION: 0

## 2024-09-16 ASSESSMENT — PAIN SCALES - GENERAL: PAINLEVEL: 0-NO PAIN

## 2024-09-16 NOTE — PROGRESS NOTES
Subjective   Patient ID: Zoran Block is a 64 y.o. male who presents for Follow-up (6mon follow up).    HPI      H/O hypertension- compliant with medications and low-salt diet. Denied any side effects from the medication. Denied any headaches, chest pain, pedal edema.              H/O of tobacco abuse. Quit smoking 15 years ago. Stated he used to smoke only half a pack a day, smoked for 20-25 years.         H/O Anxiety- stated since he retired, anxiety symptoms better. Denied any complaints today.         Elevated PSA- has not followed with urologist yet  Saw Dr Bonner for GERD, had  Ph testing, symptoms better now. H/O esophageal and stomach cancer in family    Refused pneumonia vaccine    Review of Systems   Constitutional:  Negative for fatigue and unexpected weight change.   HENT:  Negative for congestion, ear pain, sinus pain and sore throat.    Respiratory:  Negative for cough, shortness of breath and wheezing.    Cardiovascular:  Negative for chest pain, palpitations and leg swelling.   Gastrointestinal:  Negative for abdominal pain, blood in stool and constipation.   Endocrine: Negative for polydipsia, polyphagia and polyuria.   Genitourinary:  Negative for hematuria and urgency.   Musculoskeletal:  Negative for arthralgias and joint swelling.   Skin:  Negative for rash.   Neurological:  Negative for tremors, syncope, weakness and numbness.   Psychiatric/Behavioral:  Positive for behavioral problems. The patient is not nervous/anxious.        Objective   /82 (BP Location: Left arm, Patient Position: Sitting, BP Cuff Size: Adult)   Pulse 67   Temp 36.7 °C (98.1 °F) (Temporal)   Wt 85.7 kg (189 lb)   SpO2 98%   BMI 27.12 kg/m²     Physical Exam  Constitutional:       Appearance: Normal appearance.   HENT:      Head: Normocephalic and atraumatic.   Eyes:      Extraocular Movements: Extraocular movements intact.      Conjunctiva/sclera: Conjunctivae normal.   Cardiovascular:      Rate and Rhythm:  "Normal rate and regular rhythm.      Heart sounds: No murmur heard.  Pulmonary:      Effort: Pulmonary effort is normal. No respiratory distress.      Breath sounds: Normal breath sounds.   Abdominal:      General: Abdomen is flat. Bowel sounds are normal.      Palpations: Abdomen is soft.   Musculoskeletal:      Right lower leg: No edema.      Left lower leg: No edema.   Neurological:      Mental Status: He is alert.         Assessment/Plan       Jayjay \"Zoran\" was seen today for follow-up.  Diagnoses and all orders for this visit:  Essential hypertension (Primary)  Pre-diabetes  Elevated PSA, less than 10 ng/ml  Comments:  Advised follow up with Urology  Orders:  -     Referral to Urology; Future  Elevated blood pressure reading  Dyslipidemia    Discussed recent labs, advised follow-up with urology  Advised low-carb diet    Lab Results   Component Value Date    HGBA1C 5.9 (H) 08/20/2024         Lab Results   Component Value Date    WBC 7.4 08/20/2024    HGB 13.7 08/20/2024    HCT 41.5 08/20/2024    MCV 92 08/20/2024     08/20/2024     Lab Results   Component Value Date    GLUCOSE 104 (H) 08/20/2024    CALCIUM 9.2 08/20/2024     08/20/2024    K 4.8 08/20/2024    CO2 28 08/20/2024     08/20/2024    BUN 22 08/20/2024    CREATININE 1.30 08/20/2024     Lab Results   Component Value Date    PSA 5.0 (H) 02/25/2020    PSA 3.9 03/09/2018     Lab Results   Component Value Date    CHOL 163 08/20/2024    CHOL 185 02/25/2020    CHOL 171 03/16/2019     Lab Results   Component Value Date    HDL 44.0 08/20/2024    HDL 43 02/25/2020    HDL 46.6 03/16/2019     Lab Results   Component Value Date    LDLCALC 99 08/20/2024    LDLCALC 121 02/25/2020     Lab Results   Component Value Date    TRIG 102 08/20/2024    TRIG 107 02/25/2020    TRIG 57 03/16/2019     No components found for: \"CHOLHDL\"    Prostate Specific Antigen, Screen  Order: [id number]   Status: Final result       Visible to patient: Yes (seen)       Dx: " "Screening for prostate cancer    1 Result Note      Component  Ref Range & Units 4 wk ago   Prostate Specific Antigen,Screen  <=4.10 ng/mL 7.00 High    Resulting Agency [facility]                     Transthoracic Echo (TTE) Complete    Height: 1.778 m (5' 10\")   Weight: 86.7 kg (191 lb 3.2 oz)   Blood Pressure: Not recorded    Date of Study: 8/20/24   Ordering Provider: Jd Mckee MD   Clinical Indications: hypertensive heart disease       Reading Physicians  Performing Staff   Cardiology: Richard Hamilton DO    Tech: RT Elvia        Indications  Priority: Routine  hypertensive heart disease   Dx: Essential hypertension [I10 (ICD-10-CM)]; Congestive heart failure with cardiomyopathy and cardiomegaly (Multi) [I50.9, I42.9, I51.7 (ICD-10-CM)]     PACS Images     Show images for Transthoracic echo (TTE) complete  Interpretation [place] [address]             Phone [phone]     TRANSTHORACIC ECHOCARDIOGRAM REPORT     Patient Name:      RENEE STOKES      Reading Physician:    [id number] Richard Hamilton DO  Study Date:        8/20/2024            Ordering Provider:    [id number] JD MCKEE  MRN/PID:           [id number]             Fellow:  Accession#:        [id number]         Nurse:  Date of Birth/Age: 1960 / 63 years Sonographer:          Fariha Mas                                                                RDCS  Gender:            M                    Additional Staff:  Height:            177.80 cm            Admit Date:  Weight:            86.64 kg             Admission Status:     Outpatient  BSA / BMI:         2.05 m2 / 27.41      Department Location:  [facility]                     kg/m2  Blood Pressure: 160 /76 mmHg     Study Type:    TRANSTHORACIC ECHO (TTE) COMPLETE  Diagnosis/ICD: " Essential (primary) hypertension-I10; Heart failure,                 unspecified-I50.9; Cardiomegaly-I51.7  Indication:    HTN, CHF  CPT Codes:     Echo Complete w Full Doppler-03219     Patient History:  Pertinent History: HTN, CHF, Cardiomyopathy and Cardiomegaly.     Study Detail: The following Echo studies were performed: 2D, M-Mode, Doppler and                color flow.        PHYSICIAN INTERPRETATION:  Left Ventricle: Left ventricular ejection fraction is normal, by visual estimate at 55-60%. There are no regional wall motion abnormalities. The left ventricular cavity size is normal. Spectral Doppler shows an impaired relaxation pattern of left ventricular diastolic filling.  Left Atrium: The left atrium is normal in size.  Right Ventricle: The right ventricle is normal in size. There is normal right ventricular global systolic function.  Right Atrium: The right atrium is normal in size.  Aortic Valve: The aortic valve is trileaflet. There is no evidence of aortic valve regurgitation. The peak instantaneous gradient of the aortic valve is 8.2 mmHg. The mean gradient of the aortic valve is 2.7 mmHg.  Mitral Valve: The mitral valve is normal in structure. There is no evidence of mitral valve regurgitation.  Tricuspid Valve: The tricuspid valve is structurally normal. There is trace tricuspid regurgitation.  Pulmonic Valve: The pulmonic valve is not well visualized. The pulmonic valve regurgitation was not well visualized.  Pericardium: There is no pericardial effusion noted.  Aorta: The aortic root is normal.        CONCLUSIONS:   1. Left ventricular ejection fraction is normal, by visual estimate at 55-60%.   2. Spectral Doppler shows an impaired relaxation pattern of left ventricular diastolic filling.   3. There is normal right ventricular global systolic function.    Current Outpatient Medications   Medication Instructions    amLODIPine (NORVASC) 2.5 mg, oral, Daily    calcium carbonate (CALCIUM 600 ORAL)  oral    calcium carbonate-vitamin D3 (Calcium 600 + D,3,) 600 mg-5 mcg (200 unit) capsule Every 24 hours    cholecalciferol (Vitamin D-3) 25 MCG (1000 UT) capsule 1 capsule, Every 24 hours    lisinopril 40 mg, oral, Daily    omega 3-dha-epa-fish oil (Fish OiL) 1,200 (144-216) mg capsule 1 capsule, oral, Daily    pantoprazole (ProtoNix) 40 mg EC tablet 1 tablet, oral, Daily    saw palmetto 500 mg capsule as directed Orally    vit C-Zn gluc-herbal no.325 (Elderberry Zinc Vit C) 90-15 mg lozenge 1 tablet, oral, Daily

## 2024-09-17 PROBLEM — I73.9 CLAUDICATION (CMS-HCC): Status: RESOLVED | Noted: 2023-09-14 | Resolved: 2024-09-17

## 2024-09-17 ASSESSMENT — ENCOUNTER SYMPTOMS
HEMATURIA: 0
NUMBNESS: 0
FATIGUE: 0
SINUS PAIN: 0
ABDOMINAL PAIN: 0
WHEEZING: 0
CONSTIPATION: 0
COUGH: 0
TREMORS: 0
BLOOD IN STOOL: 0
WEAKNESS: 0
POLYDIPSIA: 0
SORE THROAT: 0
UNEXPECTED WEIGHT CHANGE: 0
POLYPHAGIA: 0
SHORTNESS OF BREATH: 0
ARTHRALGIAS: 0
JOINT SWELLING: 0
NERVOUS/ANXIOUS: 0
PALPITATIONS: 0

## 2024-10-21 DIAGNOSIS — I10 ESSENTIAL HYPERTENSION: ICD-10-CM

## 2024-10-21 RX ORDER — LISINOPRIL 40 MG/1
40 TABLET ORAL DAILY
Qty: 90 TABLET | Refills: 1 | Status: SHIPPED | OUTPATIENT
Start: 2024-10-21

## 2024-10-21 RX ORDER — AMLODIPINE BESYLATE 2.5 MG/1
2.5 TABLET ORAL DAILY
Qty: 90 TABLET | Refills: 1 | Status: SHIPPED | OUTPATIENT
Start: 2024-10-21

## 2024-11-07 ENCOUNTER — HOSPITAL ENCOUNTER (OUTPATIENT)
Dept: RADIOLOGY | Facility: HOSPITAL | Age: 64
Discharge: HOME | End: 2024-11-07
Payer: MEDICARE

## 2024-11-07 DIAGNOSIS — I10 ESSENTIAL HYPERTENSION: ICD-10-CM

## 2024-11-07 PROCEDURE — 75571 CT HRT W/O DYE W/CA TEST: CPT

## 2025-01-16 ENCOUNTER — OFFICE VISIT (OUTPATIENT)
Dept: UROLOGY | Facility: HOSPITAL | Age: 65
End: 2025-01-16
Payer: MEDICARE

## 2025-01-16 VITALS — DIASTOLIC BLOOD PRESSURE: 79 MMHG | HEART RATE: 66 BPM | SYSTOLIC BLOOD PRESSURE: 181 MMHG

## 2025-01-16 DIAGNOSIS — R97.20 ELEVATED PSA, LESS THAN 10 NG/ML: ICD-10-CM

## 2025-01-16 PROCEDURE — 3077F SYST BP >= 140 MM HG: CPT | Performed by: UROLOGY

## 2025-01-16 PROCEDURE — 3078F DIAST BP <80 MM HG: CPT | Performed by: UROLOGY

## 2025-01-16 PROCEDURE — 99214 OFFICE O/P EST MOD 30 MIN: CPT | Performed by: UROLOGY

## 2025-01-16 PROCEDURE — 99204 OFFICE O/P NEW MOD 45 MIN: CPT | Performed by: UROLOGY

## 2025-01-16 PROCEDURE — 1159F MED LIST DOCD IN RCRD: CPT | Performed by: UROLOGY

## 2025-01-16 PROCEDURE — 1036F TOBACCO NON-USER: CPT | Performed by: UROLOGY

## 2025-01-16 PROCEDURE — 1157F ADVNC CARE PLAN IN RCRD: CPT | Performed by: UROLOGY

## 2025-01-16 PROCEDURE — G2211 COMPLEX E/M VISIT ADD ON: HCPCS | Performed by: UROLOGY

## 2025-01-16 ASSESSMENT — PATIENT HEALTH QUESTIONNAIRE - PHQ9
1. LITTLE INTEREST OR PLEASURE IN DOING THINGS: NOT AT ALL
SUM OF ALL RESPONSES TO PHQ9 QUESTIONS 1 AND 2: 0
2. FEELING DOWN, DEPRESSED OR HOPELESS: NOT AT ALL

## 2025-01-16 NOTE — PROGRESS NOTES
"NPV     HISTORY OF PRESENT ILLNESS:   Jayjay Block is a 64 y.o. male who is being seen today for ELEVATED PSA referred by PCP Joselyn Ma  -Most recent PSA 7.00 on 8/20/24  -Prior PSA 5.0 in 2020  PAST MEDICAL HISTORY:  Past Medical History:   Diagnosis Date    Acute sinusitis 09/14/2023    Anxiety     Bilateral tinnitus     GERD (gastroesophageal reflux disease)     Headache 09/14/2023    Hypertension        PAST SURGICAL HISTORY:  Past Surgical History:   Procedure Laterality Date    APPENDECTOMY      CHOLECYSTECTOMY      VASECTOMY          ALLERGIES:   No Known Allergies     MEDICATIONS:   Current Outpatient Medications   Medication Instructions    amLODIPine (NORVASC) 2.5 mg, oral, Daily    calcium carbonate (CALCIUM 600 ORAL) Take by mouth.    calcium carbonate-vitamin D3 (Calcium 600 + D,3,) 600 mg-5 mcg (200 unit) capsule Every 24 hours    cholecalciferol (Vitamin D-3) 25 MCG (1000 UT) capsule 1 capsule, Every 24 hours    lisinopril 40 mg, oral, Daily    omega 3-dha-epa-fish oil (Fish OiL) 1,200 (144-216) mg capsule 1 capsule, Daily    pantoprazole (ProtoNix) 40 mg EC tablet 1 tablet, Daily    saw palmetto 500 mg capsule as directed Orally    vit C-Zn gluc-herbal no.325 (Elderberry Zinc Vit C) 90-15 mg lozenge 1 tablet, Daily        PHYSICAL EXAM:  Blood pressure (!) 181/79, pulse 66.  Constitutional: Patient appears well-developed and well-nourished. No distress.    Pulmonary/Chest: Effort normal. No respiratory distress.   Abdominal: Soft, ND NT  : WNL  Musculoskeletal: Normal range of motion.    Neurological: Alert and oriented to person, place, and time.  Psychiatric: Normal mood and affect. Behavior is normal. Thought content normal.      Labs:  No results found for: \"TESTOSTERONE\"  Lab Results   Component Value Date    PSA 5.0 (H) 02/25/2020   Most recent PSA 7.00 on 8/20/24  No components found for: \"CBC\"  Lab Results   Component Value Date    CREATININE 1.30 08/20/2024     No components found " "for: \"TESTOTMS\"  No results found for: \"TESTF\"    Imaging:    AUA:15  VILLA:21  PVR: 12ml    Discussion: Results reviewed with patient. Patient reassured. No urinary complaints at this time. Denies hematuria, dysuria, nocturia, flank pain, and bothersome frequency or urgency. Denies pushing or straining to void and states he feels he empties his bladder when voiding.Denies any fhx of prostate cancer. His father may have had an enlarged prostate. With elevation in PSA, recommend doing a prostate MRI and follow up with results.Patient in agreement with plan.         Assessment:      1. Elevated PSA, less than 10 ng/ml  Referral to Urology    Advised follow up with Urology             Plan:   Get a prostate MRI and follow up with results.  All questions and concerns were addressed. Patient verbalizes understanding and has no other questions at this time.     Scribe Attestation  By signing my name below, [email or web address] , Scribe   attest that this documentation has been prepared under the direction and in the presence of Dominic Gray MD.    "

## 2025-01-24 ENCOUNTER — HOSPITAL ENCOUNTER (OUTPATIENT)
Dept: RADIOLOGY | Facility: HOSPITAL | Age: 65
Discharge: HOME | End: 2025-01-24
Payer: MEDICARE

## 2025-01-24 VITALS — WEIGHT: 188.93 LBS | BODY MASS INDEX: 27.11 KG/M2

## 2025-01-24 DIAGNOSIS — R97.20 ELEVATED PSA, LESS THAN 10 NG/ML: ICD-10-CM

## 2025-01-24 PROCEDURE — 2550000001 HC RX 255 CONTRASTS: Performed by: UROLOGY

## 2025-01-24 PROCEDURE — A9575 INJ GADOTERATE MEGLUMI 0.1ML: HCPCS | Performed by: UROLOGY

## 2025-01-24 PROCEDURE — 72197 MRI PELVIS W/O & W/DYE: CPT

## 2025-01-24 RX ORDER — GADOTERATE MEGLUMINE 376.9 MG/ML
17 INJECTION INTRAVENOUS
Status: COMPLETED | OUTPATIENT
Start: 2025-01-24 | End: 2025-01-24

## 2025-01-24 RX ADMIN — GADOTERATE MEGLUMINE 17 ML: 376.9 INJECTION INTRAVENOUS at 13:32

## 2025-02-06 NOTE — PROGRESS NOTES
Virtual or Telephone Consent    An interactive audio and video telecommunication system which permits real time communications between the patient (at the originating site) and provider (at the distant site) was utilized to provide this telehealth service.   Verbal consent was requested and obtained from Jayjay Block on this date, 02/07/25 for a telehealth visit.  FUV    Last Visit: 1/16/25     HISTORY OF PRESENT ILLNESS:   Jayjay Block is a 64 y.o. male who is being seen today for elevated PSA referred by PCP Joselyn Ma  -no fhx of prostate CA  -Most recent PSA 7.00 on 8/20/24  -Prior PSA 5.0 in 2020  -Negative MRI 1/24/25    PAST MEDICAL HISTORY:  Past Medical History:   Diagnosis Date    Acute sinusitis 09/14/2023    Anxiety     Bilateral tinnitus     GERD (gastroesophageal reflux disease)     Headache 09/14/2023    Hypertension        PAST SURGICAL HISTORY:  Past Surgical History:   Procedure Laterality Date    APPENDECTOMY      CHOLECYSTECTOMY      VASECTOMY          ALLERGIES:   No Known Allergies     MEDICATIONS:   Current Outpatient Medications   Medication Instructions    amLODIPine (NORVASC) 2.5 mg, oral, Daily    calcium carbonate (CALCIUM 600 ORAL) Take by mouth.    calcium carbonate-vitamin D3 (Calcium 600 + D,3,) 600 mg-5 mcg (200 unit) capsule Every 24 hours    cholecalciferol (Vitamin D-3) 25 MCG (1000 UT) capsule 1 capsule, Every 24 hours    lisinopril 40 mg, oral, Daily    omega 3-dha-epa-fish oil (Fish OiL) 1,200 (144-216) mg capsule 1 capsule, Daily    pantoprazole (ProtoNix) 40 mg EC tablet 1 tablet, Daily    saw palmetto 500 mg capsule as directed Orally    vit C-Zn gluc-herbal no.325 (Elderberry Zinc Vit C) 90-15 mg lozenge 1 tablet, Daily        PHYSICAL EXAM:  There were no vitals taken for this visit.  Constitutional: Patient appears well-developed and well-nourished. No distress.    Pulmonary/Chest: Effort normal. No respiratory distress.   Abdominal: Soft, ND NT  :  "WNL  Musculoskeletal: Normal range of motion.    Neurological: Alert and oriented to person, place, and time.  Psychiatric: Normal mood and affect. Behavior is normal. Thought content normal.      Labs:  No results found for: \"TESTOSTERONE\"  Lab Results   Component Value Date    PSA 5.0 (H) 02/25/2020   Most recent PSA 7.00 on 8/20/24  No components found for: \"CBC\"  Lab Results   Component Value Date    CREATININE 1.30 08/20/2024     No components found for: \"TESTOTMS\"  No results found for: \"TESTF\"    Imaging: Prostate MRI, 1/24/25: BPH changes of the transition zone.  ( PI-RADS 2).    AUA:15  VILLA:21  PVR:     Discussion: Results reviewed with patient. Patient reassured. No urinary complaints at this time. Denies hematuria, dysuria, nocturia, flank pain, and bothersome frequency or urgency. Denies pushing or straining to void and states he feels he empties his bladder when voiding. Denies any fhx of prostate cancer. Discussed trending PSA vs doing a biopsy. Patient desires to monitor PSA for now. Will get a PSA in 3 and 6 months with a follow up in 6 months to discuss trend.    Assessment:      1. Elevated PSA, less than 10 ng/ml  PSA    PSA               Plan:   Will get a PSA in 3 and 6 months with a follow up in 6 months to discuss trend.  All questions and concerns were addressed. Patient verbalizes understanding and has no other questions at this time.     Scribe Attestation  By signing my name below, IDipika Scribe   attest that this documentation has been prepared under the direction and in the presence of Dominic Gray MD.    "

## 2025-02-07 ENCOUNTER — TELEMEDICINE (OUTPATIENT)
Dept: UROLOGY | Facility: HOSPITAL | Age: 65
End: 2025-02-07
Payer: MEDICARE

## 2025-02-07 DIAGNOSIS — R97.20 ELEVATED PSA, LESS THAN 10 NG/ML: ICD-10-CM

## 2025-02-07 PROCEDURE — G2211 COMPLEX E/M VISIT ADD ON: HCPCS | Performed by: UROLOGY

## 2025-02-07 PROCEDURE — 99214 OFFICE O/P EST MOD 30 MIN: CPT | Performed by: UROLOGY

## 2025-02-07 PROCEDURE — 1157F ADVNC CARE PLAN IN RCRD: CPT | Performed by: UROLOGY

## 2025-03-17 DIAGNOSIS — K22.70 BARRETT'S ESOPHAGUS WITHOUT DYSPLASIA: Primary | ICD-10-CM

## 2025-03-19 ENCOUNTER — APPOINTMENT (OUTPATIENT)
Dept: PRIMARY CARE | Facility: CLINIC | Age: 65
End: 2025-03-19

## 2025-03-19 ENCOUNTER — TELEPHONE (OUTPATIENT)
Dept: GASTROENTEROLOGY | Facility: HOSPITAL | Age: 65
End: 2025-03-19
Payer: MEDICARE

## 2025-03-19 RX ORDER — PANTOPRAZOLE SODIUM 40 MG/1
40 TABLET, DELAYED RELEASE ORAL DAILY
Qty: 90 TABLET | Refills: 3 | Status: SHIPPED | OUTPATIENT
Start: 2025-03-19 | End: 2026-03-19

## 2025-03-24 ENCOUNTER — PATIENT MESSAGE (OUTPATIENT)
Dept: PRIMARY CARE | Facility: CLINIC | Age: 65
End: 2025-03-24
Payer: MEDICARE

## 2025-03-24 DIAGNOSIS — R73.03 PRE-DIABETES: ICD-10-CM

## 2025-03-24 DIAGNOSIS — E78.5 DYSLIPIDEMIA: Primary | ICD-10-CM

## 2025-03-25 ENCOUNTER — OFFICE VISIT (OUTPATIENT)
Dept: CARDIOLOGY | Facility: CLINIC | Age: 65
End: 2025-03-25
Payer: MEDICARE

## 2025-03-25 VITALS
DIASTOLIC BLOOD PRESSURE: 84 MMHG | OXYGEN SATURATION: 99 % | WEIGHT: 195.1 LBS | BODY MASS INDEX: 27.93 KG/M2 | HEART RATE: 72 BPM | SYSTOLIC BLOOD PRESSURE: 148 MMHG | HEIGHT: 70 IN

## 2025-03-25 DIAGNOSIS — E78.5 HYPERLIPIDEMIA, UNSPECIFIED HYPERLIPIDEMIA TYPE: ICD-10-CM

## 2025-03-25 DIAGNOSIS — I10 ESSENTIAL HYPERTENSION: Primary | ICD-10-CM

## 2025-03-25 PROCEDURE — 99214 OFFICE O/P EST MOD 30 MIN: CPT | Performed by: INTERNAL MEDICINE

## 2025-03-25 PROCEDURE — 1159F MED LIST DOCD IN RCRD: CPT | Performed by: INTERNAL MEDICINE

## 2025-03-25 PROCEDURE — 3077F SYST BP >= 140 MM HG: CPT | Performed by: INTERNAL MEDICINE

## 2025-03-25 PROCEDURE — 3008F BODY MASS INDEX DOCD: CPT | Performed by: INTERNAL MEDICINE

## 2025-03-25 PROCEDURE — 1160F RVW MEDS BY RX/DR IN RCRD: CPT | Performed by: INTERNAL MEDICINE

## 2025-03-25 PROCEDURE — 3079F DIAST BP 80-89 MM HG: CPT | Performed by: INTERNAL MEDICINE

## 2025-03-25 PROCEDURE — G2211 COMPLEX E/M VISIT ADD ON: HCPCS | Performed by: INTERNAL MEDICINE

## 2025-03-25 PROCEDURE — 1157F ADVNC CARE PLAN IN RCRD: CPT | Performed by: INTERNAL MEDICINE

## 2025-03-25 RX ORDER — ROSUVASTATIN CALCIUM 10 MG/1
10 TABLET, COATED ORAL DAILY
Qty: 30 TABLET | Refills: 11 | Status: SHIPPED | OUTPATIENT
Start: 2025-03-25 | End: 2026-03-25

## 2025-03-25 RX ORDER — AMLODIPINE BESYLATE 5 MG/1
5 TABLET ORAL DAILY
Qty: 30 TABLET | Refills: 11 | Status: SHIPPED | OUTPATIENT
Start: 2025-03-25 | End: 2026-03-25

## 2025-03-25 ASSESSMENT — ENCOUNTER SYMPTOMS
OCCASIONAL FEELINGS OF UNSTEADINESS: 0
DEPRESSION: 0
LOSS OF SENSATION IN FEET: 0

## 2025-03-25 NOTE — PROGRESS NOTES
Primary Care Physician: Joselyn Ma MD  Date of Visit: 03/25/2025  2:45 PM EDT  Location of visit: [facility]     Chief Complaint:   No chief complaint on file.    HPI / Summary:   Jayjay Block is a 64 y.o. male presents for new patient.    ROS    Medical History:   He has a past medical history of Acute sinusitis (09/14/2023), Anxiety, Bilateral tinnitus, GERD (gastroesophageal reflux disease), Headache (09/14/2023), and Hypertension.  Surgical Hx:   He has a past surgical history that includes Appendectomy; Cholecystectomy; and Vasectomy.   Social Hx:   He reports that he quit smoking about 20 years ago. His smoking use included cigarettes. He started smoking about 46 years ago. He has a 19.5 pack-year smoking history. He has never used smokeless tobacco. He reports current alcohol use of about 4.0 standard drinks of alcohol per week. He reports current drug use. Frequency: 4.00 times per week. Drug: Marijuana.  Family Hx:   His family history includes Cancer in his father; Dementia in his mother; Esophageal cancer in his father; Heart disease in his father; Parkinsonism in his mother.   Allergies:  No Known Allergies  Outpatient Medications:  Current Outpatient Medications   Medication Instructions    amLODIPine (NORVASC) 2.5 mg, oral, Daily    calcium carbonate (CALCIUM 600 ORAL) Take by mouth.    calcium carbonate-vitamin D3 (Calcium 600 + D,3,) 600 mg-5 mcg (200 unit) capsule Every 24 hours    cholecalciferol (Vitamin D-3) 25 MCG (1000 UT) capsule 1 capsule, Every 24 hours    lisinopril 40 mg, oral, Daily    omega 3-dha-epa-fish oil (Fish OiL) 1,200 (144-216) mg capsule 1 capsule, Daily    pantoprazole (PROTONIX) 40 mg, oral, Daily    saw palmetto 500 mg capsule as directed Orally    vit C-Zn gluc-herbal no.325 (Elderberry Zinc Vit C) 90-15 mg lozenge 1 tablet, Daily     Physical Exam:  Vitals:    03/25/25 1447 03/25/25 1453   BP: (!) 170/91 149/89   BP Location: Right arm Left arm   Patient  "Position: Sitting Sitting   BP Cuff Size: Adult Adult   Pulse: 75    SpO2: 99%    Weight: 88.5 kg (195 lb 1.6 oz)    Height: 1.778 m (5' 10\")      Wt Readings from Last 5 Encounters:   03/25/25 88.5 kg (195 lb 1.6 oz)   01/24/25 85.7 kg (188 lb 15 oz)   09/16/24 85.7 kg (189 lb)   07/30/24 86.7 kg (191 lb 3.2 oz)   03/06/24 86.2 kg (190 lb)     Physical Exam  JVP not elevated. Carotid impulses are 2+ without overlying bruit.   Chest exhibits fair to good air movement with completely clear breath sounds.   The cardiac rhythm is regular with no premature beats.   Normal S1 and S2. No gallop, murmur or rub, or click.   Abdomen is soft and benign without focal tenderness.   With no lower leg edema. The pedal pulses are intact.     Last Labs:  No visits with results within 6 Month(s) from this visit.   Latest known visit with results is:   Hospital Outpatient Visit on 08/20/2024   Component Date Value    AV pk becca 08/20/2024 1.44     LVOT diam 08/20/2024 2.14     AV mn grad 08/20/2024 2.7     MV E/A ratio 08/20/2024 0.98     Tricuspid annular plane * 08/20/2024 2.9     LA vol index A/L 08/20/2024 19.9     MV avg E/e' ratio 08/20/2024 7.40     LV EF 08/20/2024 58     RV free wall pk S' 08/20/2024 13.87     RVSP 08/20/2024 24.8     LVIDd 08/20/2024 4.58     AV pk grad 08/20/2024 8.2     Aortic Valve Area by Con* 08/20/2024 2.08     LV A4C EF 08/20/2024 62.7         Assessment/Plan   1.  Hypertension.  This patient was diagnosed as being hypertensive approximately 5 years ago initially started on lisinopril 40 mg daily.  Recent blood pressure readings remained elevated and he was given a prescription for amlodipine 5 mg daily.  Subsequent to this he began to have symptoms of postural hypotension and lightheadedness with standing up or bending over.  He discontinued the amlodipine and felt improved.  He was seen by his primary care provider and ultimately restarted on a lower dose of amlodipine 2.5 mg daily.  The patient " states that his home blood pressure readings are averaging in the range of 128/75.  Office blood pressure 160/76.  Patient will continue on present therapy but return in 3 months.  He will purchase a home blood pressure monitor and provided a log of his readings upon return.  He will also bring his home blood pressure cuff to verify its accuracy.  Follow-up blood pressure at time of office visit 3/25/2025 is 148/84 with the patient's personal machine reading 173/109.  Will increase the amlodipine from 2.5 mg daily to 5 mg.  2.  Nondiabetic.  3.  Not hyperlipidemic.  Patient did have a lipid panel on 2020 with cholesterol 185  triglyceride 107.  Will defer therapy unless his CT coronary calcium score is elevated.  The patient did have a slightly elevated CT coronary calcium score of 114 performed on 2024.  As such she will be started on rosuvastatin 10 mg daily.  His lab work from 2024 prior to initiation of treatment includes cholesterol 163 LDL 99 HDL 44 triglyceride 102.  Additional lab work from that time includes a normal CBC glycohemoglobin 5.9% TSH 1.82 PSA of 7.00.  Will check a repeat lipid panel prior to his next visit.  4.  Former smoker.  This patient was a smoker 3/4 pack/day between the ages of [age] but he has been abstinent since that time  5.?  Coronary artery disease.  The patient's family history is positive for father who had underwent 2 CABG procedures ultimately  of esophageal cancer.  He has 3 sisters 1 brother who are alive and well.  He did have a nuclear stress test performed on 3/4/2020 which was unremarkable.  EKG during office visit 2024 shows sinus bradycardia 52/min LVH by voltage criteria.  The patient will be scheduled for a CT coronary calcium score.  He will also have an echocardiogram performed at the time of his next visit in 3 months.  The patient performed on 2024 demonstrating a preserved LV ejection fraction of 55 to 60%.  Review of the CT  calcium score of 114 performed on 11/7/2024.  He remains asymptomatic.  6.  Status post appendectomy age 6.  7.  Status post vasectomy.  8.  Status post laparoscopic cholecystectomy.        Orders:  No orders of the defined types were placed in this encounter.     Followup Appts:  Future Appointments   Date Time Provider Department Center   4/3/2025  1:00 PM Joselyn Ma MD [id number]   7/25/2025  9:10 AM Dominic Gray MD [facility]           ____________________________________________________________  Jd Mckee MD  [facility]  Assistant Clinical Professor, [employer]  [facility]

## 2025-04-01 LAB
ALBUMIN SERPL-MCNC: 4.3 G/DL (ref 3.6–5.1)
ALP SERPL-CCNC: 57 U/L (ref 35–144)
ALT SERPL-CCNC: 8 U/L (ref 9–46)
ANION GAP SERPL CALCULATED.4IONS-SCNC: 6 MMOL/L (CALC) (ref 7–17)
AST SERPL-CCNC: 14 U/L (ref 10–35)
BILIRUB SERPL-MCNC: 0.4 MG/DL (ref 0.2–1.2)
BUN SERPL-MCNC: 20 MG/DL (ref 7–25)
CALCIUM SERPL-MCNC: 9.4 MG/DL (ref 8.6–10.3)
CHLORIDE SERPL-SCNC: 107 MMOL/L (ref 98–110)
CHOLEST SERPL-MCNC: 191 MG/DL
CHOLEST/HDLC SERPL: 4.7 (CALC)
CO2 SERPL-SCNC: 29 MMOL/L (ref 20–32)
CREAT SERPL-MCNC: 1.2 MG/DL (ref 0.7–1.35)
EGFRCR SERPLBLD CKD-EPI 2021: 67 ML/MIN/1.73M2
EST. AVERAGE GLUCOSE BLD GHB EST-MCNC: 117 MG/DL
EST. AVERAGE GLUCOSE BLD GHB EST-SCNC: 6.5 MMOL/L
GLUCOSE SERPL-MCNC: 104 MG/DL (ref 65–99)
HBA1C MFR BLD: 5.7 % OF TOTAL HGB
HDLC SERPL-MCNC: 41 MG/DL
LDLC SERPL CALC-MCNC: 121 MG/DL (CALC)
NONHDLC SERPL-MCNC: 150 MG/DL (CALC)
POTASSIUM SERPL-SCNC: 4.8 MMOL/L (ref 3.5–5.3)
PROT SERPL-MCNC: 6.7 G/DL (ref 6.1–8.1)
SODIUM SERPL-SCNC: 142 MMOL/L (ref 135–146)
TRIGL SERPL-MCNC: 169 MG/DL

## 2025-04-03 ENCOUNTER — OFFICE VISIT (OUTPATIENT)
Dept: PRIMARY CARE | Facility: CLINIC | Age: 65
End: 2025-04-03
Payer: MEDICARE

## 2025-04-03 VITALS
DIASTOLIC BLOOD PRESSURE: 88 MMHG | HEIGHT: 70 IN | TEMPERATURE: 97.7 F | BODY MASS INDEX: 27.2 KG/M2 | HEART RATE: 81 BPM | OXYGEN SATURATION: 96 % | SYSTOLIC BLOOD PRESSURE: 130 MMHG | WEIGHT: 190 LBS

## 2025-04-03 DIAGNOSIS — R97.20 ELEVATED PSA, LESS THAN 10 NG/ML: Chronic | ICD-10-CM

## 2025-04-03 DIAGNOSIS — E78.5 DYSLIPIDEMIA: ICD-10-CM

## 2025-04-03 DIAGNOSIS — K22.70 BARRETT'S ESOPHAGUS WITHOUT DYSPLASIA: ICD-10-CM

## 2025-04-03 DIAGNOSIS — R73.03 PRE-DIABETES: ICD-10-CM

## 2025-04-03 DIAGNOSIS — Z00.00 MEDICARE ANNUAL WELLNESS VISIT, SUBSEQUENT: Primary | ICD-10-CM

## 2025-04-03 DIAGNOSIS — I10 ESSENTIAL HYPERTENSION: ICD-10-CM

## 2025-04-03 PROCEDURE — 1157F ADVNC CARE PLAN IN RCRD: CPT | Performed by: INTERNAL MEDICINE

## 2025-04-03 PROCEDURE — 99397 PER PM REEVAL EST PAT 65+ YR: CPT | Mod: 25 | Performed by: INTERNAL MEDICINE

## 2025-04-03 PROCEDURE — 1159F MED LIST DOCD IN RCRD: CPT | Performed by: INTERNAL MEDICINE

## 2025-04-03 PROCEDURE — G0439 PPPS, SUBSEQ VISIT: HCPCS | Performed by: INTERNAL MEDICINE

## 2025-04-03 PROCEDURE — G2211 COMPLEX E/M VISIT ADD ON: HCPCS | Performed by: INTERNAL MEDICINE

## 2025-04-03 PROCEDURE — 3008F BODY MASS INDEX DOCD: CPT | Performed by: INTERNAL MEDICINE

## 2025-04-03 PROCEDURE — 99215 OFFICE O/P EST HI 40 MIN: CPT | Performed by: INTERNAL MEDICINE

## 2025-04-03 PROCEDURE — 1126F AMNT PAIN NOTED NONE PRSNT: CPT | Performed by: INTERNAL MEDICINE

## 2025-04-03 PROCEDURE — 3075F SYST BP GE 130 - 139MM HG: CPT | Performed by: INTERNAL MEDICINE

## 2025-04-03 PROCEDURE — 3079F DIAST BP 80-89 MM HG: CPT | Performed by: INTERNAL MEDICINE

## 2025-04-03 PROCEDURE — 1036F TOBACCO NON-USER: CPT | Performed by: INTERNAL MEDICINE

## 2025-04-03 PROCEDURE — 99397 PER PM REEVAL EST PAT 65+ YR: CPT | Performed by: INTERNAL MEDICINE

## 2025-04-03 ASSESSMENT — ENCOUNTER SYMPTOMS
TREMORS: 0
NERVOUS/ANXIOUS: 0
BLOOD IN STOOL: 0
ABDOMINAL PAIN: 0
WHEEZING: 0
CONSTIPATION: 0
COUGH: 0
POLYPHAGIA: 0
DEPRESSION: 0
WEAKNESS: 0
SINUS PAIN: 0
PALPITATIONS: 0
JOINT SWELLING: 0
SHORTNESS OF BREATH: 0
SORE THROAT: 0
LOSS OF SENSATION IN FEET: 0
POLYDIPSIA: 0
ARTHRALGIAS: 0
OCCASIONAL FEELINGS OF UNSTEADINESS: 0
HEMATURIA: 0
NUMBNESS: 0
UNEXPECTED WEIGHT CHANGE: 0
FATIGUE: 0

## 2025-04-03 ASSESSMENT — COLUMBIA-SUICIDE SEVERITY RATING SCALE - C-SSRS
6. HAVE YOU EVER DONE ANYTHING, STARTED TO DO ANYTHING, OR PREPARED TO DO ANYTHING TO END YOUR LIFE?: NO
2. HAVE YOU ACTUALLY HAD ANY THOUGHTS OF KILLING YOURSELF?: NO
1. IN THE PAST MONTH, HAVE YOU WISHED YOU WERE DEAD OR WISHED YOU COULD GO TO SLEEP AND NOT WAKE UP?: NO

## 2025-04-03 ASSESSMENT — PAIN SCALES - GENERAL: PAINLEVEL_OUTOF10: 0-NO PAIN

## 2025-04-03 ASSESSMENT — PATIENT HEALTH QUESTIONNAIRE - PHQ9
2. FEELING DOWN, DEPRESSED OR HOPELESS: NOT AT ALL
1. LITTLE INTEREST OR PLEASURE IN DOING THINGS: NOT AT ALL
SUM OF ALL RESPONSES TO PHQ9 QUESTIONS 1 AND 2: 0

## 2025-04-03 NOTE — PROGRESS NOTES
"Subjective   Patient ID: Zoran Block is a 64 y.o. male who presents for Annual Exam.    HPI      H/O hypertension- compliant with medications and low-salt diet. Denied any side effects from the medication. Denied any headaches, chest pain, pedal edema.              H/O of tobacco abuse. Quit smoking 15 years ago. Stated he used to smoke only half a pack a day, smoked for 20-25 years.         H/O Anxiety- stated since he retired, anxiety symptoms better. Denied any complaints today.         Elevated PSA- seeing urology, MRI prostate normal 01/2025  Saw Dr Bonner for GERD, had  Ph testing, symptoms better now. H/O esophageal and stomach cancer in family    Refused pneumonia vaccine    CT cardiac scoring of 114.66 2025    Had MRI prostate, he was advised to get prostate biopsy, which he deferred     Review of Systems   Constitutional:  Negative for fatigue and unexpected weight change.   HENT:  Negative for congestion, ear pain, sinus pain and sore throat.    Respiratory:  Negative for cough, shortness of breath and wheezing.    Cardiovascular:  Negative for chest pain, palpitations and leg swelling.   Gastrointestinal:  Negative for abdominal pain, blood in stool and constipation.   Endocrine: Negative for polydipsia, polyphagia and polyuria.   Genitourinary:  Negative for hematuria and urgency.   Musculoskeletal:  Negative for arthralgias and joint swelling.   Skin:  Negative for rash.   Neurological:  Negative for tremors, syncope, weakness and numbness.   Psychiatric/Behavioral:  Positive for behavioral problems. The patient is not nervous/anxious.        Objective   /88 (BP Location: Left arm, Patient Position: Sitting, BP Cuff Size: Large adult)   Pulse 81   Temp 36.5 °C (97.7 °F) (Temporal)   Ht 1.778 m (5' 10\")   Wt 86.2 kg (190 lb)   SpO2 96%   BMI 27.26 kg/m²     Physical Exam  Constitutional:       Appearance: Normal appearance.   HENT:      Head: Normocephalic and atraumatic.      Right Ear: " "Tympanic membrane normal.      Left Ear: Tympanic membrane normal.   Eyes:      Extraocular Movements: Extraocular movements intact.      Conjunctiva/sclera: Conjunctivae normal.   Cardiovascular:      Rate and Rhythm: Normal rate and regular rhythm.      Heart sounds: No murmur heard.  Pulmonary:      Effort: Pulmonary effort is normal. No respiratory distress.      Breath sounds: Normal breath sounds.   Abdominal:      General: Abdomen is flat. Bowel sounds are normal.      Palpations: Abdomen is soft.      Tenderness: There is no abdominal tenderness. There is no guarding.   Musculoskeletal:      Right lower leg: No edema.      Left lower leg: No edema.   Lymphadenopathy:      Cervical: No cervical adenopathy.   Skin:     Findings: No rash.   Neurological:      Mental Status: He is alert and oriented to person, place, and time.      Cranial Nerves: No cranial nerve deficit.   Psychiatric:         Mood and Affect: Mood normal.         Assessment/Plan       Jayjay \"Zoran\" was seen today for annual exam.  Diagnoses and all orders for this visit:  Medicare annual wellness visit, subsequent (Primary)  Essential hypertension  -     Basic Metabolic Panel; Future  Dyslipidemia  Pre-diabetes  -     Hemoglobin A1C; Future  Jimenez's esophagus without dysplasia  Elevated PSA, less than 10 ng/ml      Discussed recent labs, Advised low-carb diet    MR prostate with kyle boundaries if pirads 3 or above  Status: Final result     PACS Images     Show images for MR prostate with kyle boundaries if pirads 3 or above  Signed by    Signed Time Phone Pager   Arnav Butler MD 1/24/2025 14:28 [phone]      Exam Information    Status Exam Begun Exam Ended   Final 1/24/2025 12:22 1/24/2025 13:32     Study Result    Narrative & Impression   Interpreted By:  Arnav Butler and Liller Gregory   STUDY:  MR PROSTATE WITH KYLE BOUNDARIES IF PIRADS 3 OR ABOVE;  1/24/2025 1:32  pm      INDICATION:  Signs/Symptoms:elevated psa.  PSA " 7.0      ,R97.20 Elevated prostate specific antigen (PSA)      COMPARISON:  None.      ACCESSION NUMBER(S):  [id number]      ORDERING CLINICIAN:  ROMERO RODRIGUEZ      TECHNIQUE:  Multiplanar MRI of the pelvis was obtained including axial, sagittal  and coronal T2 weighted SSFSE, axial and sagittal T2 FSE, axial DWI,  pre and post gadolinium dynamic T1 GRE sequences. Multiparametric  analysis was performed.      17 ML of Dotarem was administered intravenously without immediate  complications.      FINDINGS:  PROSTATE VOLUME:  The prostate measures 4.9 x 3.6 x 3.6 cm in right-to-left,  anterior-posterior and craniocaudal dimension.      Prostate weight is estimated at 33.3 cc. PSA density is 0.211 ng/mL/g.      PROSTATE PARENCHYMA:  There is heterogeneous enlargement of the transition zone, consistent  with benign prostatic hyperplasia. There is no evidence of clinically  significant neoplasm.      EXTRACAPSULAR EXTENSION:  None.      SEMINAL VESICLES:  Within normal limits.      PELVIC LYMPH NODES:  No abnormally enlarged pelvic lymph nodes are identified.      PERITONEUM:  No free or loculated fluid collections are evident in the pelvis.      OTHER ORGANS:  Diverticulosis of the visualized sigmoid colon without evidence to  suggest acute diverticulitis. There is mild circumferential  thickening of the bladder, with prominent trabeculation, likely  sequela of chronic outlet obstruction.      BONES:  No focal lesions are noted in the bone.      Exam Quality:  Is T2WI weighted imaging of diagnostic quality:  Yes. T2WI  assessment:  Adequate. Is DWI of diagnostic quality:  Yes. DWI  assessment:  Adequate. Is DCE of diagnostic quality:  Yes. DCE  assessment:  Adequate. PI-QUAL score:  All sequences are of optimal  diagnostic quality Comments:      IMPRESSION:  BPH changes of the transition zone. Diffuse non nodular  hypointensities within the peripheral zone, without evidence of  focally restricted diffusion ( PI-RADS 2).    "   PI-RADS 2 - Low (clinically significant cancer is unlikely to be  present).      I personally reviewed the images/study and I agree with the findings  as stated above by resident physician, Dr. Asif Charles.      MACRO:  None      Signed by: Arnav Butler 1/24/2025 2:28 PM  Dictation workstation:   [id number]       === 11/07/24 ===    CT CARDIAC SCORING WO IV CONTRAST    - Impression -  Coronary artery calcium score of  114.66        Coronary artery calcium scoring may be helpful in predicting the risk  for future coronary heart disease events.  According to the American  College of Cardiology Foundation Clinical Expert Consensus Task  Force, such testing provides important prognostic information in  patients with more than one coronary heart disease risk factor. The  coronary artery calcium score correlates with the annual risk of a  non-fatal myocardial infarction or coronary heart disease death.    Coronary artery score            Annual Risk    0-99                               0.4%  100-399                          1.3%  >400                              2.4%    These three \"breakpoints\" correspond to lower, intermediate and high  risk states for future coronary events.  Such information should be  used, along with appropriate clinical judgment, to make decisions  regarding the intensity of risk factor management strategies to treat  blood lipids and to modify other non-lipid coronary risk factors.    Reference: Mayo P et al. Circulation.  2007; 115:402-426    Signed by: Richard Salgado 11/11/2024 4:13 PM  Dictation workstation:   [id number]      Lab Results   Component Value Date    HGBA1C 5.7 (H) 03/31/2025         Lab Results   Component Value Date    WBC 7.4 08/20/2024    HGB 13.7 08/20/2024    HCT 41.5 08/20/2024    MCV 92 08/20/2024     08/20/2024     Lab Results   Component Value Date    GLUCOSE 104 (H) 03/31/2025    CALCIUM 9.4 03/31/2025     03/31/2025    K 4.8 03/31/2025 " "   CO2 29 03/31/2025     03/31/2025    BUN 20 03/31/2025    CREATININE 1.20 03/31/2025     Lab Results   Component Value Date    PSA 5.0 (H) 02/25/2020    PSA 3.9 03/09/2018     Lab Results   Component Value Date    CHOL 191 03/31/2025    CHOL 163 08/20/2024    CHOL 185 02/25/2020     Lab Results   Component Value Date    HDL 41 03/31/2025    HDL 44.0 08/20/2024    HDL 43 02/25/2020     Lab Results   Component Value Date    LDLCALC 121 (H) 03/31/2025    LDLCALC 99 08/20/2024    LDLCALC 121 02/25/2020     Lab Results   Component Value Date    TRIG 169 (H) 03/31/2025    TRIG 102 08/20/2024    TRIG 107 02/25/2020     No components found for: \"CHOLHDL\"    Prostate Specific Antigen, Screen  Order: [id number]   Status: Final result       Visible to patient: Yes (seen)       Dx: Screening for prostate cancer    1 Result Note      Component  Ref Range & Units 4 wk ago   Prostate Specific Antigen,Screen  <=4.10 ng/mL 7.00 High    Resulting Agency [facility]                     Transthoracic Echo (TTE) Complete    Height: 1.778 m (5' 10\")   Weight: 86.7 kg (191 lb 3.2 oz)   Blood Pressure: Not recorded    Date of Study: 8/20/24   Ordering Provider: Jd Mckee MD   Clinical Indications: hypertensive heart disease       Reading Physicians  Performing Staff   Cardiology: Richard Hamilton DO    Tech: Fariha Mas, RT        Indications  Priority: Routine  hypertensive heart disease   Dx: Essential hypertension [I10 (ICD-10-CM)]; Congestive heart failure with cardiomyopathy and cardiomegaly (Multi) [I50.9, I42.9, I51.7 (ICD-10-CM)]     PACS Images     Show images for Transthoracic echo (TTE) complete  Interpretation [place] [address]             Phone [phone]     TRANSTHORACIC ECHOCARDIOGRAM REPORT     Patient Name:      RENEE ORELLANA DIVYA      Reading Physician:    [id number] Richard Hamilton                                                                " DO  Study Date:        8/20/2024            Ordering Provider:    [id number] PARIS WHITE  MRN/PID:           [id number]             Fellow:  Accession#:        [id number]         Nurse:  Date of Birth/Age: 1960 / 63 years Sonographer:          Fariha Mas                                                                RDCS  Gender:            M                    Additional Staff:  Height:            177.80 cm            Admit Date:  Weight:            86.64 kg             Admission Status:     Outpatient  BSA / BMI:         2.05 m2 / 27.41      Department Location:  [facility]                     kg/m2  Blood Pressure: 160 /76 mmHg     Study Type:    TRANSTHORACIC ECHO (TTE) COMPLETE  Diagnosis/ICD: Essential (primary) hypertension-I10; Heart failure,                 unspecified-I50.9; Cardiomegaly-I51.7  Indication:    HTN, CHF  CPT Codes:     Echo Complete w Full Doppler-[id number]     Patient History:  Pertinent History: HTN, CHF, Cardiomyopathy and Cardiomegaly.     Study Detail: The following Echo studies were performed: 2D, M-Mode, Doppler and                color flow.        PHYSICIAN INTERPRETATION:  Left Ventricle: Left ventricular ejection fraction is normal, by visual estimate at 55-60%. There are no regional wall motion abnormalities. The left ventricular cavity size is normal. Spectral Doppler shows an impaired relaxation pattern of left ventricular diastolic filling.  Left Atrium: The left atrium is normal in size.  Right Ventricle: The right ventricle is normal in size. There is normal right ventricular global systolic function.  Right Atrium: The right atrium is normal in size.  Aortic Valve: The aortic valve is trileaflet. There is no evidence of aortic valve regurgitation. The peak instantaneous gradient of the aortic valve is 8.2 mmHg. The mean gradient of the aortic valve is 2.7 mmHg.  Mitral Valve: The mitral valve is normal  in structure. There is no evidence of mitral valve regurgitation.  Tricuspid Valve: The tricuspid valve is structurally normal. There is trace tricuspid regurgitation.  Pulmonic Valve: The pulmonic valve is not well visualized. The pulmonic valve regurgitation was not well visualized.  Pericardium: There is no pericardial effusion noted.  Aorta: The aortic root is normal.        CONCLUSIONS:   1. Left ventricular ejection fraction is normal, by visual estimate at 55-60%.   2. Spectral Doppler shows an impaired relaxation pattern of left ventricular diastolic filling.   3. There is normal right ventricular global systolic function.    Current Outpatient Medications   Medication Instructions    amLODIPine (NORVASC) 5 mg, oral, Daily    calcium carbonate (CALCIUM 600 ORAL) Take by mouth.    calcium carbonate-vitamin D3 (Calcium 600 + D,3,) 600 mg-5 mcg (200 unit) capsule Every 24 hours    cholecalciferol (Vitamin D-3) 25 MCG (1000 UT) capsule 1 capsule, Every 24 hours    lisinopril 40 mg, oral, Daily    omega 3-dha-epa-fish oil (Fish OiL) 1,200 (144-216) mg capsule 1 capsule, Daily    pantoprazole (PROTONIX) 40 mg, oral, Daily    rosuvastatin (CRESTOR) 10 mg, oral, Daily    saw palmetto 500 mg capsule as directed Orally    vit C-Zn gluc-herbal no.325 (Elderberry Zinc Vit C) 90-15 mg lozenge 1 tablet, Daily

## 2025-04-17 DIAGNOSIS — I10 ESSENTIAL HYPERTENSION: ICD-10-CM

## 2025-04-18 RX ORDER — LISINOPRIL 40 MG/1
40 TABLET ORAL DAILY
Qty: 90 TABLET | Refills: 1 | Status: SHIPPED | OUTPATIENT
Start: 2025-04-18

## 2025-05-02 DIAGNOSIS — R97.20 ELEVATED PSA, LESS THAN 10 NG/ML: ICD-10-CM

## 2025-05-28 ENCOUNTER — TELEPHONE (OUTPATIENT)
Dept: GASTROENTEROLOGY | Facility: HOSPITAL | Age: 65
End: 2025-05-28
Payer: MEDICARE

## 2025-05-28 NOTE — TELEPHONE ENCOUNTER
Patent has been taking 2 Pantoprazole a day needs refill  Pantoprazole 40mg, 2 a day  [id number]

## 2025-05-30 DIAGNOSIS — K22.70 BARRETT'S ESOPHAGUS WITHOUT DYSPLASIA: ICD-10-CM

## 2025-05-30 RX ORDER — PANTOPRAZOLE SODIUM 40 MG/1
40 TABLET, DELAYED RELEASE ORAL
Qty: 180 TABLET | Refills: 3 | Status: SHIPPED | OUTPATIENT
Start: 2025-05-30 | End: 2026-05-30

## 2025-07-24 NOTE — PROGRESS NOTES
Virtual or Telephone Consent    An interactive audio and video telecommunication system which permits real time communications between the patient (at the originating site) and provider (at the distant site) was utilized to provide this telehealth service.   Verbal consent was requested and obtained from Jayjay Block on this date, 07/23/25 for a telehealth visit.  FUV    Last Visit: 2/7/25     HISTORY OF PRESENT ILLNESS:   Jayjay Block is a 64 y.o. male who is being seen today for elevated PSA referred by PCP Joselyn Ma  -no fhx of prostate CA  -Most recent PSA 7.00 on 8/20/24  -Prior PSA 5.0 in 2020  -Negative MRI 1/24/25    PAST MEDICAL HISTORY:  Past Medical History:   Diagnosis Date    Acute sinusitis 09/14/2023    Anxiety     Bilateral tinnitus     GERD (gastroesophageal reflux disease)     Headache 09/14/2023    Hypertension        PAST SURGICAL HISTORY:  Past Surgical History:   Procedure Laterality Date    APPENDECTOMY      CHOLECYSTECTOMY      VASECTOMY          ALLERGIES:   No Known Allergies     MEDICATIONS:   Current Outpatient Medications   Medication Instructions    amLODIPine (NORVASC) 5 mg, oral, Daily    calcium carbonate (CALCIUM 600 ORAL) Take by mouth.    calcium carbonate-vitamin D3 (Calcium 600 + D,3,) 600 mg-5 mcg (200 unit) capsule Every 24 hours    cholecalciferol (Vitamin D-3) 25 MCG (1000 UT) capsule 1 capsule, Every 24 hours    lisinopril 40 mg, oral, Daily    omega 3-dha-epa-fish oil (Fish OiL) 1,200 (144-216) mg capsule 1 capsule, Daily    pantoprazole (PROTONIX) 40 mg, oral, 2 times daily before meals    rosuvastatin (CRESTOR) 10 mg, oral, Daily    saw palmetto 500 mg capsule as directed Orally    vit C-Zn gluc-herbal no.325 (Elderberry Zinc Vit C) 90-15 mg lozenge 1 tablet, Daily        PHYSICAL EXAM:  There were no vitals taken for this visit.  Constitutional: Patient appears well-developed and well-nourished. No distress.    Pulmonary/Chest: Effort normal. No respiratory  "distress.   Abdominal: Soft, ND NT  : WNL  Musculoskeletal: Normal range of motion.    Neurological: Alert and oriented to person, place, and time.  Psychiatric: Normal mood and affect. Behavior is normal. Thought content normal.      Labs:  No results found for: \"TESTOSTERONE\"  Lab Results   Component Value Date    PSA 5.0 (H) 02/25/2020   Most recent PSA 7.00 on 8/20/24  No components found for: \"CBC\"  Lab Results   Component Value Date    CREATININE 1.20 03/31/2025     No components found for: \"TESTOTMS\"  No results found for: \"TESTF\"    Imaging: Prostate MRI, 1/24/25: BPH changes of the transition zone.  ( PI-RADS 2).    AUA:15  VILLA:21  PVR:     Discussion: Results reviewed with patient. Patient reassured. No urinary complaints at this time. Denies hematuria, dysuria, nocturia, flank pain, and bothersome frequency or urgency. Denies pushing or straining to void and states he feels he empties his bladder when voiding. Denies any fhx of prostate cancer. Discussed trending PSA vs doing a biopsy. Patient desires to monitor PSA for now. Will get a PSA in 3 and 6 months with a follow up in 6 months to discuss trend.    Assessment:      No diagnosis found.           Plan:   Will get a PSA in 3 and 6 months with a follow up in 6 months to discuss trend.  All questions and concerns were addressed. Patient verbalizes understanding and has no other questions at this time.     Scribe Attestation  By signing my name below, IDipika Scribe   attest that this documentation has been prepared under the direction and in the presence of Dominic Gray MD.    "

## 2025-07-25 ENCOUNTER — APPOINTMENT (OUTPATIENT)
Dept: UROLOGY | Facility: HOSPITAL | Age: 65
End: 2025-07-25
Payer: MEDICARE

## 2025-07-25 DIAGNOSIS — R97.20 ELEVATED PSA, LESS THAN 10 NG/ML: ICD-10-CM

## 2025-08-10 DIAGNOSIS — I10 ESSENTIAL HYPERTENSION: ICD-10-CM

## 2025-08-10 DIAGNOSIS — K22.70 BARRETT'S ESOPHAGUS WITHOUT DYSPLASIA: ICD-10-CM

## 2025-08-11 RX ORDER — AMLODIPINE BESYLATE 5 MG/1
5 TABLET ORAL DAILY
Qty: 90 TABLET | Refills: 3 | Status: SHIPPED | OUTPATIENT
Start: 2025-08-11

## 2025-08-11 RX ORDER — LISINOPRIL 40 MG/1
40 TABLET ORAL DAILY
Qty: 90 TABLET | Refills: 1 | Status: SHIPPED | OUTPATIENT
Start: 2025-08-11 | End: 2026-02-07

## 2025-08-12 RX ORDER — PANTOPRAZOLE SODIUM 40 MG/1
40 TABLET, DELAYED RELEASE ORAL
Qty: 90 TABLET | Refills: 3 | Status: SHIPPED | OUTPATIENT
Start: 2025-08-12 | End: 2026-08-12